# Patient Record
Sex: FEMALE | Race: BLACK OR AFRICAN AMERICAN | NOT HISPANIC OR LATINO | Employment: UNEMPLOYED | RURAL
[De-identification: names, ages, dates, MRNs, and addresses within clinical notes are randomized per-mention and may not be internally consistent; named-entity substitution may affect disease eponyms.]

---

## 2020-08-01 ENCOUNTER — HISTORICAL (OUTPATIENT)
Dept: ADMINISTRATIVE | Facility: HOSPITAL | Age: 35
End: 2020-08-01

## 2020-08-01 LAB — SARS-COV+SARS-COV-2 AG RESP QL IA.RAPID: NEGATIVE

## 2021-05-10 ENCOUNTER — PROCEDURE VISIT (OUTPATIENT)
Dept: OBSTETRICS AND GYNECOLOGY | Facility: CLINIC | Age: 36
End: 2021-05-10
Payer: COMMERCIAL

## 2021-05-10 ENCOUNTER — OFFICE VISIT (OUTPATIENT)
Dept: OBSTETRICS AND GYNECOLOGY | Facility: CLINIC | Age: 36
End: 2021-05-10
Payer: COMMERCIAL

## 2021-05-10 VITALS
OXYGEN SATURATION: 99 % | BODY MASS INDEX: 35.73 KG/M2 | RESPIRATION RATE: 18 BRPM | HEIGHT: 60 IN | SYSTOLIC BLOOD PRESSURE: 145 MMHG | WEIGHT: 182 LBS | HEART RATE: 86 BPM | DIASTOLIC BLOOD PRESSURE: 95 MMHG | TEMPERATURE: 98 F

## 2021-05-10 DIAGNOSIS — R10.2 PELVIC PAIN: ICD-10-CM

## 2021-05-10 DIAGNOSIS — Z11.4 ENCOUNTER FOR SCREENING FOR HIV: ICD-10-CM

## 2021-05-10 DIAGNOSIS — Z12.4 SCREENING FOR MALIGNANT NEOPLASM OF THE CERVIX: Primary | ICD-10-CM

## 2021-05-10 DIAGNOSIS — Z72.51 HIGH RISK SEXUAL BEHAVIOR, UNSPECIFIED TYPE: ICD-10-CM

## 2021-05-10 DIAGNOSIS — R10.2 PELVIC PAIN: Primary | ICD-10-CM

## 2021-05-10 DIAGNOSIS — Z11.3 SCREENING EXAMINATION FOR VENEREAL DISEASE: ICD-10-CM

## 2021-05-10 DIAGNOSIS — Z01.419 ROUTINE GYNECOLOGICAL EXAMINATION: ICD-10-CM

## 2021-05-10 LAB
HAV IGM SER QL: NORMAL
HBV CORE IGM SER QL: NORMAL
HBV SURFACE AG SERPL QL IA: NORMAL
HCV AB SER QL: NORMAL
HIV 1+O+2 AB SERPL QL: NORMAL
SYPHILIS AB INTERPRETATION: NORMAL

## 2021-05-10 PROCEDURE — 80074 ACUTE HEPATITIS PANEL: CPT | Mod: ,,, | Performed by: CLINICAL MEDICAL LABORATORY

## 2021-05-10 PROCEDURE — 36415 PR COLLECTION VENOUS BLOOD,VENIPUNCTURE: ICD-10-PCS | Mod: ,,, | Performed by: OBSTETRICS & GYNECOLOGY

## 2021-05-10 PROCEDURE — 87510 BACTERIAL VAGINOSIS: ICD-10-PCS | Mod: ,,, | Performed by: CLINICAL MEDICAL LABORATORY

## 2021-05-10 PROCEDURE — 86780 TREPONEMA PALLIDUM: CPT | Mod: ,,, | Performed by: CLINICAL MEDICAL LABORATORY

## 2021-05-10 PROCEDURE — 36415 COLL VENOUS BLD VENIPUNCTURE: CPT | Mod: ,,, | Performed by: OBSTETRICS & GYNECOLOGY

## 2021-05-10 PROCEDURE — 87591 N.GONORRHOEAE DNA AMP PROB: CPT | Mod: ,,, | Performed by: CLINICAL MEDICAL LABORATORY

## 2021-05-10 PROCEDURE — 3008F BODY MASS INDEX DOCD: CPT | Mod: ,,, | Performed by: OBSTETRICS & GYNECOLOGY

## 2021-05-10 PROCEDURE — 99499 NO LOS: ICD-10-PCS | Mod: ,,, | Performed by: OBSTETRICS & GYNECOLOGY

## 2021-05-10 PROCEDURE — 76856 US EXAM PELVIC COMPLETE: CPT | Mod: ,,, | Performed by: OBSTETRICS & GYNECOLOGY

## 2021-05-10 PROCEDURE — 88142 CYTOPATH C/V THIN LAYER: CPT | Mod: GCY | Performed by: OBSTETRICS & GYNECOLOGY

## 2021-05-10 PROCEDURE — 87510 GARDNER VAG DNA DIR PROBE: CPT | Mod: ,,, | Performed by: CLINICAL MEDICAL LABORATORY

## 2021-05-10 PROCEDURE — 87480 BACTERIAL VAGINOSIS: ICD-10-PCS | Mod: ,,, | Performed by: CLINICAL MEDICAL LABORATORY

## 2021-05-10 PROCEDURE — 99385 PREV VISIT NEW AGE 18-39: CPT | Mod: 25,,, | Performed by: OBSTETRICS & GYNECOLOGY

## 2021-05-10 PROCEDURE — 76856 PR  ECHO,PELVIC (NONOBSTETRIC): ICD-10-PCS | Mod: ,,, | Performed by: OBSTETRICS & GYNECOLOGY

## 2021-05-10 PROCEDURE — 87480 CANDIDA DNA DIR PROBE: CPT | Mod: ,,, | Performed by: CLINICAL MEDICAL LABORATORY

## 2021-05-10 PROCEDURE — 1126F AMNT PAIN NOTED NONE PRSNT: CPT | Mod: ,,, | Performed by: OBSTETRICS & GYNECOLOGY

## 2021-05-10 PROCEDURE — 86780 TREPONEMA PALLIDUM (SYPHILIS) ANTIBODY: ICD-10-PCS | Mod: ,,, | Performed by: CLINICAL MEDICAL LABORATORY

## 2021-05-10 PROCEDURE — 80074 HEPATITIS PANEL, ACUTE: ICD-10-PCS | Mod: ,,, | Performed by: CLINICAL MEDICAL LABORATORY

## 2021-05-10 PROCEDURE — 86703 HIV-1/HIV-2 1 RESULT ANTBDY: CPT | Mod: ,,, | Performed by: CLINICAL MEDICAL LABORATORY

## 2021-05-10 PROCEDURE — 1126F PR PAIN SEVERITY QUANTIFIED, NO PAIN PRESENT: ICD-10-PCS | Mod: ,,, | Performed by: OBSTETRICS & GYNECOLOGY

## 2021-05-10 PROCEDURE — 87491 CHLMYD TRACH DNA AMP PROBE: CPT | Mod: ,,, | Performed by: CLINICAL MEDICAL LABORATORY

## 2021-05-10 PROCEDURE — 87591 CHLAMYDIA/GONORRHOEAE(GC), PCR: ICD-10-PCS | Mod: ,,, | Performed by: CLINICAL MEDICAL LABORATORY

## 2021-05-10 PROCEDURE — 87491 CHLAMYDIA/GONORRHOEAE(GC), PCR: ICD-10-PCS | Mod: ,,, | Performed by: CLINICAL MEDICAL LABORATORY

## 2021-05-10 PROCEDURE — 3008F PR BODY MASS INDEX (BMI) DOCUMENTED: ICD-10-PCS | Mod: ,,, | Performed by: OBSTETRICS & GYNECOLOGY

## 2021-05-10 PROCEDURE — 99385 PR PREVENTIVE VISIT,NEW,18-39: ICD-10-PCS | Mod: 25,,, | Performed by: OBSTETRICS & GYNECOLOGY

## 2021-05-10 PROCEDURE — 87660 BACTERIAL VAGINOSIS: ICD-10-PCS | Mod: ,,, | Performed by: CLINICAL MEDICAL LABORATORY

## 2021-05-10 PROCEDURE — 87660 TRICHOMONAS VAGIN DIR PROBE: CPT | Mod: ,,, | Performed by: CLINICAL MEDICAL LABORATORY

## 2021-05-10 PROCEDURE — 86703 HIV 1 / 2 ANTIBODY: ICD-10-PCS | Mod: ,,, | Performed by: CLINICAL MEDICAL LABORATORY

## 2021-05-10 PROCEDURE — 99499 UNLISTED E&M SERVICE: CPT | Mod: ,,, | Performed by: OBSTETRICS & GYNECOLOGY

## 2021-05-10 RX ORDER — ETONOGESTREL 68 MG/1
IMPLANT SUBCUTANEOUS
Status: ON HOLD | COMMUNITY
End: 2021-08-25 | Stop reason: ALTCHOICE

## 2021-05-10 RX ORDER — ERGOCALCIFEROL 1.25 MG/1
50000 CAPSULE ORAL
COMMUNITY
Start: 2021-04-17 | End: 2022-12-07 | Stop reason: SDUPTHER

## 2021-05-10 RX ORDER — POTASSIUM CHLORIDE 20 MEQ/1
20 TABLET, EXTENDED RELEASE ORAL DAILY
COMMUNITY
Start: 2021-03-04 | End: 2024-01-19

## 2021-05-10 RX ORDER — CETIRIZINE HYDROCHLORIDE 10 MG/1
TABLET ORAL
COMMUNITY
Start: 2021-03-15 | End: 2024-01-19

## 2021-05-10 RX ORDER — ALBUTEROL SULFATE 90 UG/1
AEROSOL, METERED RESPIRATORY (INHALATION)
COMMUNITY
End: 2022-12-07 | Stop reason: SDUPTHER

## 2021-05-10 RX ORDER — ATENOLOL AND CHLORTHALIDONE TABLET 50; 25 MG/1; MG/1
0.5 TABLET ORAL DAILY
COMMUNITY
Start: 2021-03-04 | End: 2023-03-15 | Stop reason: SDUPTHER

## 2021-05-10 RX ORDER — FLUOXETINE HYDROCHLORIDE 20 MG/1
40 CAPSULE ORAL DAILY
COMMUNITY
Start: 2021-04-05 | End: 2023-03-15 | Stop reason: SDUPTHER

## 2021-05-10 RX ORDER — TEMAZEPAM 30 MG/1
CAPSULE ORAL
COMMUNITY
Start: 2021-04-05 | End: 2022-12-07

## 2021-05-11 LAB
CANDIDA SPECIES: NEGATIVE
GARDNERELLA: NEGATIVE
GH SERPL-MCNC: NORMAL NG/ML
INSULIN SERPL-ACNC: NORMAL U[IU]/ML
LAB AP CLINICAL INFORMATION: NORMAL
LAB AP GYN INTERPRETATION: NEGATIVE
LAB AP PAP DISCLAIMER COMMENTS: NORMAL
RENIN PLAS-CCNC: NORMAL NG/ML/H
TRICHOMONAS: NEGATIVE

## 2021-05-13 LAB
CHLAMYDIA BY PCR: NEGATIVE
N. GONORRHOEAE (GC) BY PCR: NEGATIVE

## 2021-06-07 ENCOUNTER — OFFICE VISIT (OUTPATIENT)
Dept: OBSTETRICS AND GYNECOLOGY | Facility: CLINIC | Age: 36
End: 2021-06-07
Payer: COMMERCIAL

## 2021-06-07 VITALS
TEMPERATURE: 98 F | HEART RATE: 77 BPM | HEIGHT: 61 IN | WEIGHT: 183 LBS | DIASTOLIC BLOOD PRESSURE: 92 MMHG | RESPIRATION RATE: 18 BRPM | BODY MASS INDEX: 34.55 KG/M2 | OXYGEN SATURATION: 98 % | SYSTOLIC BLOOD PRESSURE: 134 MMHG

## 2021-06-07 DIAGNOSIS — R63.1 POLYDIPSIA: Primary | ICD-10-CM

## 2021-06-07 DIAGNOSIS — R10.2 PELVIC PAIN: ICD-10-CM

## 2021-06-07 DIAGNOSIS — N92.0 MENORRHAGIA WITH REGULAR CYCLE: ICD-10-CM

## 2021-06-07 LAB
EST. AVERAGE GLUCOSE BLD GHB EST-MCNC: 94 MG/DL
HBA1C MFR BLD HPLC: 5.4 % (ref 4.5–6.6)

## 2021-06-07 PROCEDURE — 36415 PR COLLECTION VENOUS BLOOD,VENIPUNCTURE: ICD-10-PCS | Mod: ,,, | Performed by: OBSTETRICS & GYNECOLOGY

## 2021-06-07 PROCEDURE — 99214 PR OFFICE/OUTPT VISIT, EST, LEVL IV, 30-39 MIN: ICD-10-PCS | Mod: ,,, | Performed by: OBSTETRICS & GYNECOLOGY

## 2021-06-07 PROCEDURE — 1126F PR PAIN SEVERITY QUANTIFIED, NO PAIN PRESENT: ICD-10-PCS | Mod: ,,, | Performed by: OBSTETRICS & GYNECOLOGY

## 2021-06-07 PROCEDURE — 3008F PR BODY MASS INDEX (BMI) DOCUMENTED: ICD-10-PCS | Mod: ,,, | Performed by: OBSTETRICS & GYNECOLOGY

## 2021-06-07 PROCEDURE — 3008F BODY MASS INDEX DOCD: CPT | Mod: ,,, | Performed by: OBSTETRICS & GYNECOLOGY

## 2021-06-07 PROCEDURE — 83036 HEMOGLOBIN A1C: ICD-10-PCS | Mod: ,,, | Performed by: CLINICAL MEDICAL LABORATORY

## 2021-06-07 PROCEDURE — 1126F AMNT PAIN NOTED NONE PRSNT: CPT | Mod: ,,, | Performed by: OBSTETRICS & GYNECOLOGY

## 2021-06-07 PROCEDURE — 99214 OFFICE O/P EST MOD 30 MIN: CPT | Mod: ,,, | Performed by: OBSTETRICS & GYNECOLOGY

## 2021-06-07 PROCEDURE — 36415 COLL VENOUS BLD VENIPUNCTURE: CPT | Mod: ,,, | Performed by: OBSTETRICS & GYNECOLOGY

## 2021-06-07 PROCEDURE — 83036 HEMOGLOBIN GLYCOSYLATED A1C: CPT | Mod: ,,, | Performed by: CLINICAL MEDICAL LABORATORY

## 2021-06-07 RX ORDER — SODIUM CHLORIDE 9 MG/ML
INJECTION, SOLUTION INTRAVENOUS CONTINUOUS
Status: CANCELLED | OUTPATIENT
Start: 2021-06-07

## 2021-06-07 RX ORDER — LANOLIN ALCOHOL/MO/W.PET/CERES
100 CREAM (GRAM) TOPICAL DAILY
COMMUNITY
End: 2022-12-07 | Stop reason: DRUGHIGH

## 2021-07-12 ENCOUNTER — PROCEDURE VISIT (OUTPATIENT)
Dept: OBSTETRICS AND GYNECOLOGY | Facility: CLINIC | Age: 36
End: 2021-07-12
Payer: COMMERCIAL

## 2021-07-12 VITALS
TEMPERATURE: 99 F | OXYGEN SATURATION: 98 % | WEIGHT: 186 LBS | DIASTOLIC BLOOD PRESSURE: 95 MMHG | SYSTOLIC BLOOD PRESSURE: 135 MMHG | RESPIRATION RATE: 18 BRPM | HEIGHT: 61 IN | HEART RATE: 84 BPM | BODY MASS INDEX: 35.12 KG/M2

## 2021-07-12 DIAGNOSIS — Z97.5 BREAKTHROUGH BLEEDING ON NEXPLANON: ICD-10-CM

## 2021-07-12 DIAGNOSIS — N92.0 MENORRHAGIA WITH REGULAR CYCLE: Primary | ICD-10-CM

## 2021-07-12 DIAGNOSIS — N92.1 BREAKTHROUGH BLEEDING ON NEXPLANON: ICD-10-CM

## 2021-07-12 PROCEDURE — 99024 PR POST-OP FOLLOW-UP VISIT: ICD-10-PCS | Mod: ,,, | Performed by: OBSTETRICS & GYNECOLOGY

## 2021-07-12 PROCEDURE — 11982 PR REMOVAL DRUG IMPLANT DEVICE: ICD-10-PCS | Mod: ,,, | Performed by: OBSTETRICS & GYNECOLOGY

## 2021-07-12 PROCEDURE — 99024 POSTOP FOLLOW-UP VISIT: CPT | Mod: ,,, | Performed by: OBSTETRICS & GYNECOLOGY

## 2021-07-12 PROCEDURE — 11982 REMOVE DRUG IMPLANT DEVICE: CPT | Mod: ,,, | Performed by: OBSTETRICS & GYNECOLOGY

## 2021-08-09 ENCOUNTER — OFFICE VISIT (OUTPATIENT)
Dept: OBSTETRICS AND GYNECOLOGY | Facility: CLINIC | Age: 36
End: 2021-08-09
Payer: COMMERCIAL

## 2021-08-09 VITALS
HEIGHT: 61 IN | RESPIRATION RATE: 18 BRPM | BODY MASS INDEX: 34.36 KG/M2 | DIASTOLIC BLOOD PRESSURE: 71 MMHG | WEIGHT: 182 LBS | HEART RATE: 80 BPM | OXYGEN SATURATION: 98 % | TEMPERATURE: 98 F | SYSTOLIC BLOOD PRESSURE: 103 MMHG

## 2021-08-09 DIAGNOSIS — N92.0 MENORRHAGIA WITH REGULAR CYCLE: Primary | ICD-10-CM

## 2021-08-09 DIAGNOSIS — R10.2 PELVIC PAIN: ICD-10-CM

## 2021-08-09 PROCEDURE — 3008F PR BODY MASS INDEX (BMI) DOCUMENTED: ICD-10-PCS | Mod: ,,, | Performed by: OBSTETRICS & GYNECOLOGY

## 2021-08-09 PROCEDURE — 3044F HG A1C LEVEL LT 7.0%: CPT | Mod: ,,, | Performed by: OBSTETRICS & GYNECOLOGY

## 2021-08-09 PROCEDURE — 1125F PR PAIN SEVERITY QUANTIFIED, PAIN PRESENT: ICD-10-PCS | Mod: ,,, | Performed by: OBSTETRICS & GYNECOLOGY

## 2021-08-09 PROCEDURE — 1159F PR MEDICATION LIST DOCUMENTED IN MEDICAL RECORD: ICD-10-PCS | Mod: ,,, | Performed by: OBSTETRICS & GYNECOLOGY

## 2021-08-09 PROCEDURE — 3074F PR MOST RECENT SYSTOLIC BLOOD PRESSURE < 130 MM HG: ICD-10-PCS | Mod: ,,, | Performed by: OBSTETRICS & GYNECOLOGY

## 2021-08-09 PROCEDURE — 1125F AMNT PAIN NOTED PAIN PRSNT: CPT | Mod: ,,, | Performed by: OBSTETRICS & GYNECOLOGY

## 2021-08-09 PROCEDURE — 1160F RVW MEDS BY RX/DR IN RCRD: CPT | Mod: ,,, | Performed by: OBSTETRICS & GYNECOLOGY

## 2021-08-09 PROCEDURE — 3078F PR MOST RECENT DIASTOLIC BLOOD PRESSURE < 80 MM HG: ICD-10-PCS | Mod: ,,, | Performed by: OBSTETRICS & GYNECOLOGY

## 2021-08-09 PROCEDURE — 3008F BODY MASS INDEX DOCD: CPT | Mod: ,,, | Performed by: OBSTETRICS & GYNECOLOGY

## 2021-08-09 PROCEDURE — 99214 OFFICE O/P EST MOD 30 MIN: CPT | Mod: ,,, | Performed by: OBSTETRICS & GYNECOLOGY

## 2021-08-09 PROCEDURE — 99214 PR OFFICE/OUTPT VISIT, EST, LEVL IV, 30-39 MIN: ICD-10-PCS | Mod: ,,, | Performed by: OBSTETRICS & GYNECOLOGY

## 2021-08-09 PROCEDURE — 3078F DIAST BP <80 MM HG: CPT | Mod: ,,, | Performed by: OBSTETRICS & GYNECOLOGY

## 2021-08-09 PROCEDURE — 3044F PR MOST RECENT HEMOGLOBIN A1C LEVEL <7.0%: ICD-10-PCS | Mod: ,,, | Performed by: OBSTETRICS & GYNECOLOGY

## 2021-08-09 PROCEDURE — 1160F PR REVIEW ALL MEDS BY PRESCRIBER/CLIN PHARMACIST DOCUMENTED: ICD-10-PCS | Mod: ,,, | Performed by: OBSTETRICS & GYNECOLOGY

## 2021-08-09 PROCEDURE — 1159F MED LIST DOCD IN RCRD: CPT | Mod: ,,, | Performed by: OBSTETRICS & GYNECOLOGY

## 2021-08-09 PROCEDURE — 3074F SYST BP LT 130 MM HG: CPT | Mod: ,,, | Performed by: OBSTETRICS & GYNECOLOGY

## 2021-08-25 ENCOUNTER — ANESTHESIA (OUTPATIENT)
Dept: SURGERY | Facility: HOSPITAL | Age: 36
End: 2021-08-25
Payer: COMMERCIAL

## 2021-08-25 ENCOUNTER — HOSPITAL ENCOUNTER (OUTPATIENT)
Facility: HOSPITAL | Age: 36
Discharge: HOME | End: 2021-08-25
Attending: OBSTETRICS & GYNECOLOGY | Admitting: OBSTETRICS & GYNECOLOGY
Payer: COMMERCIAL

## 2021-08-25 ENCOUNTER — ANESTHESIA EVENT (OUTPATIENT)
Dept: SURGERY | Facility: HOSPITAL | Age: 36
End: 2021-08-25
Payer: COMMERCIAL

## 2021-08-25 VITALS
SYSTOLIC BLOOD PRESSURE: 112 MMHG | HEART RATE: 96 BPM | BODY MASS INDEX: 36.32 KG/M2 | OXYGEN SATURATION: 92 % | TEMPERATURE: 98 F | WEIGHT: 185 LBS | DIASTOLIC BLOOD PRESSURE: 68 MMHG | HEIGHT: 60 IN | RESPIRATION RATE: 18 BRPM

## 2021-08-25 DIAGNOSIS — R63.1 POLYDIPSIA: ICD-10-CM

## 2021-08-25 DIAGNOSIS — N92.0 MENORRHAGIA WITH REGULAR CYCLE: ICD-10-CM

## 2021-08-25 DIAGNOSIS — Z90.710 S/P LAPAROSCOPIC HYSTERECTOMY: Primary | ICD-10-CM

## 2021-08-25 DIAGNOSIS — I10 HTN (HYPERTENSION): ICD-10-CM

## 2021-08-25 PROBLEM — R10.2 PELVIC PAIN: Status: ACTIVE | Noted: 2021-08-25

## 2021-08-25 LAB
BASOPHILS # BLD AUTO: 0.03 K/UL (ref 0–0.2)
BASOPHILS NFR BLD AUTO: 0.2 % (ref 0–1)
DIFFERENTIAL METHOD BLD: ABNORMAL
EOSINOPHIL # BLD AUTO: 0 K/UL (ref 0–0.5)
EOSINOPHIL NFR BLD AUTO: 0 % (ref 1–4)
ERYTHROCYTE [DISTWIDTH] IN BLOOD BY AUTOMATED COUNT: 14.5 % (ref 11.5–14.5)
HCT VFR BLD AUTO: 35 % (ref 38–47)
HGB BLD-MCNC: 11.9 G/DL (ref 12–16)
IMM GRANULOCYTES # BLD AUTO: 0.24 K/UL (ref 0–0.04)
IMM GRANULOCYTES NFR BLD: 1.4 % (ref 0–0.4)
LYMPHOCYTES # BLD AUTO: 1.38 K/UL (ref 1–4.8)
LYMPHOCYTES NFR BLD AUTO: 7.9 % (ref 27–41)
MCH RBC QN AUTO: 28.7 PG (ref 27–31)
MCHC RBC AUTO-ENTMCNC: 34 G/DL (ref 32–36)
MCV RBC AUTO: 84.5 FL (ref 80–96)
MONOCYTES # BLD AUTO: 0.36 K/UL (ref 0–0.8)
MONOCYTES NFR BLD AUTO: 2.1 % (ref 2–6)
MPC BLD CALC-MCNC: 8.9 FL (ref 9.4–12.4)
NEUTROPHILS # BLD AUTO: 15.42 K/UL (ref 1.8–7.7)
NEUTROPHILS NFR BLD AUTO: 88.4 % (ref 53–65)
NRBC # BLD AUTO: 0 X10E3/UL
NRBC, AUTO (.00): 0 %
PLATELET # BLD AUTO: 362 K/UL (ref 150–400)
RBC # BLD AUTO: 4.14 M/UL (ref 4.2–5.4)
WBC # BLD AUTO: 17.43 K/UL (ref 4.5–11)

## 2021-08-25 PROCEDURE — 71000033 HC RECOVERY, INTIAL HOUR: Performed by: OBSTETRICS & GYNECOLOGY

## 2021-08-25 PROCEDURE — 25000003 PHARM REV CODE 250: Performed by: NURSE ANESTHETIST, CERTIFIED REGISTERED

## 2021-08-25 PROCEDURE — 25000003 PHARM REV CODE 250: Performed by: OBSTETRICS & GYNECOLOGY

## 2021-08-25 PROCEDURE — 27000716 HC OXISENSOR PROBE, ANY SIZE: Performed by: ANESTHESIOLOGY

## 2021-08-25 PROCEDURE — 00840 ANES IPER PX LOWER ABD NOS: CPT | Performed by: OBSTETRICS & GYNECOLOGY

## 2021-08-25 PROCEDURE — 27000689 HC BLADE LARYNGOSCOPE ANY SIZE: Performed by: ANESTHESIOLOGY

## 2021-08-25 PROCEDURE — 88307 TISSUE EXAM BY PATHOLOGIST: CPT | Mod: SUR | Performed by: OBSTETRICS & GYNECOLOGY

## 2021-08-25 PROCEDURE — D9220A PRA ANESTHESIA: ICD-10-PCS | Mod: CRNA,,, | Performed by: NURSE ANESTHETIST, CERTIFIED REGISTERED

## 2021-08-25 PROCEDURE — 64488 PR TAP BLOCK BILAT; BY INJECTION(S) INCL IMAG GUIDANCE: ICD-10-PCS | Mod: XU,,, | Performed by: ANESTHESIOLOGY

## 2021-08-25 PROCEDURE — D9220A PRA ANESTHESIA: Mod: ANES,,, | Performed by: ANESTHESIOLOGY

## 2021-08-25 PROCEDURE — 58571 TLH W/T/O 250 G OR LESS: CPT | Mod: 52,,, | Performed by: SURGERY

## 2021-08-25 PROCEDURE — 27000509 HC TUBE SALEM SUMP ANY SIZE: Performed by: ANESTHESIOLOGY

## 2021-08-25 PROCEDURE — 63600175 PHARM REV CODE 636 W HCPCS: Performed by: ANESTHESIOLOGY

## 2021-08-25 PROCEDURE — 93010 ELECTROCARDIOGRAM REPORT: CPT | Mod: ,,, | Performed by: STUDENT IN AN ORGANIZED HEALTH CARE EDUCATION/TRAINING PROGRAM

## 2021-08-25 PROCEDURE — 36415 COLL VENOUS BLD VENIPUNCTURE: CPT | Performed by: OBSTETRICS & GYNECOLOGY

## 2021-08-25 PROCEDURE — 25000003 PHARM REV CODE 250: Performed by: ANESTHESIOLOGY

## 2021-08-25 PROCEDURE — D9220A PRA ANESTHESIA: ICD-10-PCS | Mod: ANES,,, | Performed by: ANESTHESIOLOGY

## 2021-08-25 PROCEDURE — 85025 COMPLETE CBC W/AUTO DIFF WBC: CPT | Performed by: OBSTETRICS & GYNECOLOGY

## 2021-08-25 PROCEDURE — 71000016 HC POSTOP RECOV ADDL HR: Performed by: OBSTETRICS & GYNECOLOGY

## 2021-08-25 PROCEDURE — 27201423 OPTIME MED/SURG SUP & DEVICES STERILE SUPPLY: Performed by: OBSTETRICS & GYNECOLOGY

## 2021-08-25 PROCEDURE — 63600175 PHARM REV CODE 636 W HCPCS: Performed by: NURSE ANESTHETIST, CERTIFIED REGISTERED

## 2021-08-25 PROCEDURE — 58571 PR LAPAROSCOPY W TOT HYSTERECTUTERUS <=250 GRAM  W TUBE/OVARY: ICD-10-PCS | Mod: 52,,, | Performed by: SURGERY

## 2021-08-25 PROCEDURE — 27000165 HC TUBE, ETT CUFFED: Performed by: ANESTHESIOLOGY

## 2021-08-25 PROCEDURE — 58571 TLH W/T/O 250 G OR LESS: CPT | Mod: ,,, | Performed by: OBSTETRICS & GYNECOLOGY

## 2021-08-25 PROCEDURE — 58571 PR LAPAROSCOPY W TOT HYSTERECTUTERUS <=250 GRAM  W TUBE/OVARY: ICD-10-PCS | Mod: ,,, | Performed by: OBSTETRICS & GYNECOLOGY

## 2021-08-25 PROCEDURE — C1729 CATH, DRAINAGE: HCPCS | Performed by: OBSTETRICS & GYNECOLOGY

## 2021-08-25 PROCEDURE — 36000711: Performed by: OBSTETRICS & GYNECOLOGY

## 2021-08-25 PROCEDURE — 94761 N-INVAS EAR/PLS OXIMETRY MLT: CPT

## 2021-08-25 PROCEDURE — 27000510 HC BLANKET BAIR HUGGER ANY SIZE: Performed by: ANESTHESIOLOGY

## 2021-08-25 PROCEDURE — 37000009 HC ANESTHESIA EA ADD 15 MINS: Performed by: OBSTETRICS & GYNECOLOGY

## 2021-08-25 PROCEDURE — 93005 ELECTROCARDIOGRAM TRACING: CPT | Mod: 59

## 2021-08-25 PROCEDURE — 27000260 *HC AIRWAY ORAL: Performed by: ANESTHESIOLOGY

## 2021-08-25 PROCEDURE — 37000008 HC ANESTHESIA 1ST 15 MINUTES: Performed by: OBSTETRICS & GYNECOLOGY

## 2021-08-25 PROCEDURE — 88307 SURGICAL PATHOLOGY: ICD-10-PCS | Mod: 26,,, | Performed by: PATHOLOGY

## 2021-08-25 PROCEDURE — D9220A PRA ANESTHESIA: Mod: CRNA,,, | Performed by: NURSE ANESTHETIST, CERTIFIED REGISTERED

## 2021-08-25 PROCEDURE — 64488 TAP BLOCK BI INJECTION: CPT | Mod: XU,,, | Performed by: ANESTHESIOLOGY

## 2021-08-25 PROCEDURE — 93010 EKG 12-LEAD: ICD-10-PCS | Mod: ,,, | Performed by: STUDENT IN AN ORGANIZED HEALTH CARE EDUCATION/TRAINING PROGRAM

## 2021-08-25 PROCEDURE — 36000710: Performed by: OBSTETRICS & GYNECOLOGY

## 2021-08-25 PROCEDURE — 88307 TISSUE EXAM BY PATHOLOGIST: CPT | Mod: 26,,, | Performed by: PATHOLOGY

## 2021-08-25 PROCEDURE — 58571 TLH W/T/O 250 G OR LESS: CPT | Mod: 52,PBBFAC | Performed by: SURGERY

## 2021-08-25 PROCEDURE — 71000015 HC POSTOP RECOV 1ST HR: Performed by: OBSTETRICS & GYNECOLOGY

## 2021-08-25 RX ORDER — OXYCODONE AND ACETAMINOPHEN 5; 325 MG/1; MG/1
1 TABLET ORAL EVERY 4 HOURS PRN
Status: DISCONTINUED | OUTPATIENT
Start: 2021-08-25 | End: 2021-08-25 | Stop reason: HOSPADM

## 2021-08-25 RX ORDER — ONDANSETRON 2 MG/ML
4 INJECTION INTRAMUSCULAR; INTRAVENOUS DAILY PRN
Status: DISCONTINUED | OUTPATIENT
Start: 2021-08-25 | End: 2021-08-25 | Stop reason: HOSPADM

## 2021-08-25 RX ORDER — ONDANSETRON 2 MG/ML
INJECTION INTRAMUSCULAR; INTRAVENOUS
Status: DISCONTINUED | OUTPATIENT
Start: 2021-08-25 | End: 2021-08-25

## 2021-08-25 RX ORDER — SODIUM CHLORIDE 9 MG/ML
INJECTION, SOLUTION INTRAVENOUS CONTINUOUS
Status: DISCONTINUED | OUTPATIENT
Start: 2021-08-25 | End: 2021-08-25 | Stop reason: HOSPADM

## 2021-08-25 RX ORDER — MORPHINE SULFATE 10 MG/ML
4 INJECTION INTRAMUSCULAR; INTRAVENOUS; SUBCUTANEOUS EVERY 5 MIN PRN
Status: DISCONTINUED | OUTPATIENT
Start: 2021-08-25 | End: 2021-08-25 | Stop reason: HOSPADM

## 2021-08-25 RX ORDER — ROPIVACAINE HYDROCHLORIDE 7.5 MG/ML
INJECTION, SOLUTION EPIDURAL; PERINEURAL
Status: COMPLETED | OUTPATIENT
Start: 2021-08-25 | End: 2021-08-25

## 2021-08-25 RX ORDER — SCOLOPAMINE TRANSDERMAL SYSTEM 1 MG/1
1 PATCH, EXTENDED RELEASE TRANSDERMAL ONCE
Status: DISCONTINUED | OUTPATIENT
Start: 2021-08-25 | End: 2021-08-25 | Stop reason: HOSPADM

## 2021-08-25 RX ORDER — FENTANYL CITRATE 50 UG/ML
INJECTION, SOLUTION INTRAMUSCULAR; INTRAVENOUS
Status: DISCONTINUED | OUTPATIENT
Start: 2021-08-25 | End: 2021-08-25

## 2021-08-25 RX ORDER — DIPHENHYDRAMINE HCL 25 MG
25 CAPSULE ORAL EVERY 4 HOURS PRN
Status: DISCONTINUED | OUTPATIENT
Start: 2021-08-25 | End: 2021-08-25 | Stop reason: HOSPADM

## 2021-08-25 RX ORDER — HYDROMORPHONE HYDROCHLORIDE 2 MG/ML
0.5 INJECTION, SOLUTION INTRAMUSCULAR; INTRAVENOUS; SUBCUTANEOUS EVERY 5 MIN PRN
Status: DISCONTINUED | OUTPATIENT
Start: 2021-08-25 | End: 2021-08-25 | Stop reason: HOSPADM

## 2021-08-25 RX ORDER — DIPHENHYDRAMINE HYDROCHLORIDE 50 MG/ML
25 INJECTION INTRAMUSCULAR; INTRAVENOUS EVERY 6 HOURS PRN
Status: DISCONTINUED | OUTPATIENT
Start: 2021-08-25 | End: 2021-08-25 | Stop reason: HOSPADM

## 2021-08-25 RX ORDER — LIDOCAINE HYDROCHLORIDE 10 MG/ML
1 INJECTION, SOLUTION EPIDURAL; INFILTRATION; INTRACAUDAL; PERINEURAL ONCE
Status: DISCONTINUED | OUTPATIENT
Start: 2021-08-25 | End: 2021-08-25 | Stop reason: HOSPADM

## 2021-08-25 RX ORDER — MORPHINE SULFATE 10 MG/ML
3 INJECTION INTRAMUSCULAR; INTRAVENOUS; SUBCUTANEOUS
Status: DISCONTINUED | OUTPATIENT
Start: 2021-08-25 | End: 2021-08-25 | Stop reason: HOSPADM

## 2021-08-25 RX ORDER — OXYCODONE AND ACETAMINOPHEN 5; 325 MG/1; MG/1
2 TABLET ORAL EVERY 4 HOURS PRN
Qty: 28 TABLET | Refills: 0 | Status: SHIPPED | OUTPATIENT
Start: 2021-08-25 | End: 2022-12-07

## 2021-08-25 RX ORDER — DEXAMETHASONE SODIUM PHOSPHATE 4 MG/ML
INJECTION, SOLUTION INTRA-ARTICULAR; INTRALESIONAL; INTRAMUSCULAR; INTRAVENOUS; SOFT TISSUE
Status: DISCONTINUED | OUTPATIENT
Start: 2021-08-25 | End: 2021-08-25

## 2021-08-25 RX ORDER — ROCURONIUM BROMIDE 50 MG/5 ML
SYRINGE (ML) INTRAVENOUS
Status: DISCONTINUED | OUTPATIENT
Start: 2021-08-25 | End: 2021-08-25

## 2021-08-25 RX ORDER — PHENYLEPHRINE HYDROCHLORIDE 10 MG/ML
INJECTION INTRAVENOUS
Status: DISCONTINUED | OUTPATIENT
Start: 2021-08-25 | End: 2021-08-25

## 2021-08-25 RX ORDER — CEFAZOLIN SODIUM 1 G/3ML
INJECTION, POWDER, FOR SOLUTION INTRAMUSCULAR; INTRAVENOUS
Status: DISCONTINUED | OUTPATIENT
Start: 2021-08-25 | End: 2021-08-25

## 2021-08-25 RX ORDER — MIDAZOLAM HYDROCHLORIDE 1 MG/ML
INJECTION INTRAMUSCULAR; INTRAVENOUS
Status: DISCONTINUED | OUTPATIENT
Start: 2021-08-25 | End: 2021-08-25

## 2021-08-25 RX ORDER — ONDANSETRON 4 MG/1
8 TABLET, ORALLY DISINTEGRATING ORAL EVERY 8 HOURS PRN
Status: DISCONTINUED | OUTPATIENT
Start: 2021-08-25 | End: 2021-08-25 | Stop reason: HOSPADM

## 2021-08-25 RX ORDER — SIMETHICONE 80 MG
80 TABLET,CHEWABLE ORAL EVERY 4 HOURS PRN
Status: DISCONTINUED | OUTPATIENT
Start: 2021-08-25 | End: 2021-08-25 | Stop reason: HOSPADM

## 2021-08-25 RX ORDER — PROCHLORPERAZINE EDISYLATE 5 MG/ML
5 INJECTION INTRAMUSCULAR; INTRAVENOUS EVERY 6 HOURS PRN
Status: DISCONTINUED | OUTPATIENT
Start: 2021-08-25 | End: 2021-08-25 | Stop reason: HOSPADM

## 2021-08-25 RX ORDER — MUPIROCIN 20 MG/G
1 OINTMENT TOPICAL 2 TIMES DAILY
Status: DISCONTINUED | OUTPATIENT
Start: 2021-08-25 | End: 2021-08-25 | Stop reason: HOSPADM

## 2021-08-25 RX ORDER — MEPERIDINE HYDROCHLORIDE 25 MG/ML
25 INJECTION INTRAMUSCULAR; INTRAVENOUS; SUBCUTANEOUS EVERY 10 MIN PRN
Status: DISCONTINUED | OUTPATIENT
Start: 2021-08-25 | End: 2021-08-25 | Stop reason: HOSPADM

## 2021-08-25 RX ORDER — ACETAMINOPHEN 500 MG
1000 TABLET ORAL ONCE
Status: COMPLETED | OUTPATIENT
Start: 2021-08-25 | End: 2021-08-25

## 2021-08-25 RX ORDER — SODIUM CHLORIDE 0.9 G/100ML
IRRIGANT IRRIGATION
Status: DISCONTINUED | OUTPATIENT
Start: 2021-08-25 | End: 2021-08-25 | Stop reason: HOSPADM

## 2021-08-25 RX ORDER — LIDOCAINE HYDROCHLORIDE 20 MG/ML
INJECTION, SOLUTION EPIDURAL; INFILTRATION; INTRACAUDAL; PERINEURAL
Status: DISCONTINUED | OUTPATIENT
Start: 2021-08-25 | End: 2021-08-25

## 2021-08-25 RX ORDER — PROPOFOL 10 MG/ML
VIAL (ML) INTRAVENOUS
Status: DISCONTINUED | OUTPATIENT
Start: 2021-08-25 | End: 2021-08-25

## 2021-08-25 RX ORDER — SODIUM CHLORIDE, SODIUM LACTATE, POTASSIUM CHLORIDE, CALCIUM CHLORIDE 600; 310; 30; 20 MG/100ML; MG/100ML; MG/100ML; MG/100ML
INJECTION, SOLUTION INTRAVENOUS CONTINUOUS
Status: DISCONTINUED | OUTPATIENT
Start: 2021-08-25 | End: 2021-08-25 | Stop reason: HOSPADM

## 2021-08-25 RX ORDER — CEFAZOLIN SODIUM 2 G/50ML
2 SOLUTION INTRAVENOUS
Status: DISCONTINUED | OUTPATIENT
Start: 2021-08-25 | End: 2021-08-25 | Stop reason: HOSPADM

## 2021-08-25 RX ADMIN — Medication 25 MG: at 08:08

## 2021-08-25 RX ADMIN — SODIUM CHLORIDE, POTASSIUM CHLORIDE, SODIUM LACTATE AND CALCIUM CHLORIDE: 600; 310; 30; 20 INJECTION, SOLUTION INTRAVENOUS at 08:08

## 2021-08-25 RX ADMIN — SUGAMMADEX 200 MG: 100 INJECTION, SOLUTION INTRAVENOUS at 10:08

## 2021-08-25 RX ADMIN — Medication 15 MG: at 09:08

## 2021-08-25 RX ADMIN — PHENYLEPHRINE HYDROCHLORIDE 100 MCG: 10 INJECTION INTRAVENOUS at 09:08

## 2021-08-25 RX ADMIN — PHENYLEPHRINE HYDROCHLORIDE 100 MCG: 10 INJECTION INTRAVENOUS at 08:08

## 2021-08-25 RX ADMIN — ONDANSETRON 4 MG: 2 INJECTION INTRAMUSCULAR; INTRAVENOUS at 07:08

## 2021-08-25 RX ADMIN — FENTANYL CITRATE 100 MCG: 50 INJECTION INTRAMUSCULAR; INTRAVENOUS at 10:08

## 2021-08-25 RX ADMIN — HYDROMORPHONE HYDROCHLORIDE 0.5 MG: 2 INJECTION, SOLUTION INTRAMUSCULAR; INTRAVENOUS; SUBCUTANEOUS at 11:08

## 2021-08-25 RX ADMIN — MORPHINE SULFATE 4 MG: 10 INJECTION INTRAVENOUS at 11:08

## 2021-08-25 RX ADMIN — CEFAZOLIN 2 G: 1 INJECTION, POWDER, FOR SOLUTION INTRAMUSCULAR; INTRAVENOUS; PARENTERAL at 07:08

## 2021-08-25 RX ADMIN — Medication 10 MG: at 08:08

## 2021-08-25 RX ADMIN — PHENYLEPHRINE HYDROCHLORIDE 100 MCG: 10 INJECTION INTRAVENOUS at 10:08

## 2021-08-25 RX ADMIN — SCOPALAMINE 1 PATCH: 1 PATCH, EXTENDED RELEASE TRANSDERMAL at 06:08

## 2021-08-25 RX ADMIN — LIDOCAINE HYDROCHLORIDE 40 MG: 20 INJECTION, SOLUTION EPIDURAL; INFILTRATION; INTRACAUDAL; PERINEURAL at 07:08

## 2021-08-25 RX ADMIN — FENTANYL CITRATE 100 MCG: 50 INJECTION INTRAMUSCULAR; INTRAVENOUS at 07:08

## 2021-08-25 RX ADMIN — DEXAMETHASONE SODIUM PHOSPHATE 8 MG: 4 INJECTION, SOLUTION INTRA-ARTICULAR; INTRALESIONAL; INTRAMUSCULAR; INTRAVENOUS; SOFT TISSUE at 07:08

## 2021-08-25 RX ADMIN — ROPIVACAINE HYDROCHLORIDE 40 ML: 7.5 INJECTION, SOLUTION EPIDURAL; PERINEURAL at 10:08

## 2021-08-25 RX ADMIN — Medication 40 MG: at 07:08

## 2021-08-25 RX ADMIN — MIDAZOLAM HYDROCHLORIDE 2 MG: 1 INJECTION, SOLUTION INTRAMUSCULAR; INTRAVENOUS at 07:08

## 2021-08-25 RX ADMIN — ACETAMINOPHEN 1000 MG: 500 TABLET ORAL at 06:08

## 2021-08-25 RX ADMIN — MORPHINE SULFATE 2 MG: 10 INJECTION INTRAVENOUS at 11:08

## 2021-08-25 RX ADMIN — PROPOFOL 180 MG: 10 INJECTION, EMULSION INTRAVENOUS at 07:08

## 2021-08-25 RX ADMIN — SODIUM CHLORIDE, POTASSIUM CHLORIDE, SODIUM LACTATE AND CALCIUM CHLORIDE: 600; 310; 30; 20 INJECTION, SOLUTION INTRAVENOUS at 07:08

## 2021-08-25 RX ADMIN — MORPHINE SULFATE 4 MG: 10 INJECTION INTRAVENOUS at 10:08

## 2021-08-26 LAB
ESTROGEN SERPL-MCNC: NORMAL PG/ML
LAB AP GROSS DESCRIPTION: NORMAL
LAB AP LABORATORY NOTES: NORMAL
T3RU NFR SERPL: NORMAL %

## 2021-09-02 ENCOUNTER — TELEPHONE (OUTPATIENT)
Dept: OBSTETRICS AND GYNECOLOGY | Facility: CLINIC | Age: 36
End: 2021-09-02

## 2021-09-09 ENCOUNTER — OFFICE VISIT (OUTPATIENT)
Dept: OBSTETRICS AND GYNECOLOGY | Facility: CLINIC | Age: 36
End: 2021-09-09
Payer: COMMERCIAL

## 2021-09-09 VITALS
DIASTOLIC BLOOD PRESSURE: 95 MMHG | RESPIRATION RATE: 18 BRPM | HEART RATE: 67 BPM | OXYGEN SATURATION: 98 % | WEIGHT: 189 LBS | SYSTOLIC BLOOD PRESSURE: 137 MMHG | HEIGHT: 60 IN | BODY MASS INDEX: 37.11 KG/M2 | TEMPERATURE: 98 F

## 2021-09-09 DIAGNOSIS — Z11.3 SCREEN FOR SEXUALLY TRANSMITTED DISEASES: Primary | ICD-10-CM

## 2021-09-09 DIAGNOSIS — N76.0 ACUTE VAGINITIS: ICD-10-CM

## 2021-09-09 DIAGNOSIS — Z72.51 RISK FOR SEXUALLY TRANSMITTED DISEASE: ICD-10-CM

## 2021-09-09 DIAGNOSIS — Z11.4 SCREENING FOR HIV (HUMAN IMMUNODEFICIENCY VIRUS): ICD-10-CM

## 2021-09-09 LAB
CANDIDA SPECIES: NEGATIVE
GARDNERELLA: POSITIVE
HAV IGM SER QL: NORMAL
HBV CORE IGM SER QL: NORMAL
HBV SURFACE AG SERPL QL IA: NORMAL
HCV AB SER QL: NORMAL
HIV 1+O+2 AB SERPL QL: NORMAL
SYPHILIS AB INTERPRETATION: NORMAL
TRICHOMONAS: NEGATIVE

## 2021-09-09 PROCEDURE — 80074 ACUTE HEPATITIS PANEL: CPT | Mod: ,,, | Performed by: CLINICAL MEDICAL LABORATORY

## 2021-09-09 PROCEDURE — 1159F MED LIST DOCD IN RCRD: CPT | Mod: ,,, | Performed by: ADVANCED PRACTICE MIDWIFE

## 2021-09-09 PROCEDURE — 87389 HIV 1 / 2 ANTIBODY: ICD-10-PCS | Mod: ,,, | Performed by: CLINICAL MEDICAL LABORATORY

## 2021-09-09 PROCEDURE — 86696 HERPES SIMPLEX TYPE 2 TEST: CPT | Mod: ,,, | Performed by: CLINICAL MEDICAL LABORATORY

## 2021-09-09 PROCEDURE — 87480 CANDIDA DNA DIR PROBE: CPT | Mod: ,,, | Performed by: CLINICAL MEDICAL LABORATORY

## 2021-09-09 PROCEDURE — 3075F PR MOST RECENT SYSTOLIC BLOOD PRESS GE 130-139MM HG: ICD-10-PCS | Mod: ,,, | Performed by: ADVANCED PRACTICE MIDWIFE

## 2021-09-09 PROCEDURE — 36415 PR COLLECTION VENOUS BLOOD,VENIPUNCTURE: ICD-10-PCS | Mod: ,,, | Performed by: ADVANCED PRACTICE MIDWIFE

## 2021-09-09 PROCEDURE — 87660 BACTERIAL VAGINOSIS: ICD-10-PCS | Mod: ,,, | Performed by: CLINICAL MEDICAL LABORATORY

## 2021-09-09 PROCEDURE — 1159F PR MEDICATION LIST DOCUMENTED IN MEDICAL RECORD: ICD-10-PCS | Mod: ,,, | Performed by: ADVANCED PRACTICE MIDWIFE

## 2021-09-09 PROCEDURE — 3044F PR MOST RECENT HEMOGLOBIN A1C LEVEL <7.0%: ICD-10-PCS | Mod: ,,, | Performed by: ADVANCED PRACTICE MIDWIFE

## 2021-09-09 PROCEDURE — 87510 GARDNER VAG DNA DIR PROBE: CPT | Mod: ,,, | Performed by: CLINICAL MEDICAL LABORATORY

## 2021-09-09 PROCEDURE — 86696 HERPES SIMPLEX 1 & 2 IGG: ICD-10-PCS | Mod: ,,, | Performed by: CLINICAL MEDICAL LABORATORY

## 2021-09-09 PROCEDURE — 87491 CHLMYD TRACH DNA AMP PROBE: CPT | Mod: ,,, | Performed by: CLINICAL MEDICAL LABORATORY

## 2021-09-09 PROCEDURE — 87389 HIV-1 AG W/HIV-1&-2 AB AG IA: CPT | Mod: ,,, | Performed by: CLINICAL MEDICAL LABORATORY

## 2021-09-09 PROCEDURE — 87660 TRICHOMONAS VAGIN DIR PROBE: CPT | Mod: ,,, | Performed by: CLINICAL MEDICAL LABORATORY

## 2021-09-09 PROCEDURE — 3075F SYST BP GE 130 - 139MM HG: CPT | Mod: ,,, | Performed by: ADVANCED PRACTICE MIDWIFE

## 2021-09-09 PROCEDURE — 87510 BACTERIAL VAGINOSIS: ICD-10-PCS | Mod: ,,, | Performed by: CLINICAL MEDICAL LABORATORY

## 2021-09-09 PROCEDURE — 86780 TREPONEMA PALLIDUM (SYPHILIS) ANTIBODY: ICD-10-PCS | Mod: ,,, | Performed by: CLINICAL MEDICAL LABORATORY

## 2021-09-09 PROCEDURE — 87491 CHLAMYDIA/GONORRHOEAE(GC), PCR: ICD-10-PCS | Mod: ,,, | Performed by: CLINICAL MEDICAL LABORATORY

## 2021-09-09 PROCEDURE — 87591 CHLAMYDIA/GONORRHOEAE(GC), PCR: ICD-10-PCS | Mod: ,,, | Performed by: CLINICAL MEDICAL LABORATORY

## 2021-09-09 PROCEDURE — 3080F DIAST BP >= 90 MM HG: CPT | Mod: ,,, | Performed by: ADVANCED PRACTICE MIDWIFE

## 2021-09-09 PROCEDURE — 80074 HEPATITIS PANEL, ACUTE: ICD-10-PCS | Mod: ,,, | Performed by: CLINICAL MEDICAL LABORATORY

## 2021-09-09 PROCEDURE — 3008F BODY MASS INDEX DOCD: CPT | Mod: ,,, | Performed by: ADVANCED PRACTICE MIDWIFE

## 2021-09-09 PROCEDURE — 86695 HERPES SIMPLEX 1 & 2 IGG: ICD-10-PCS | Mod: ,,, | Performed by: CLINICAL MEDICAL LABORATORY

## 2021-09-09 PROCEDURE — 86695 HERPES SIMPLEX TYPE 1 TEST: CPT | Mod: ,,, | Performed by: CLINICAL MEDICAL LABORATORY

## 2021-09-09 PROCEDURE — 3080F PR MOST RECENT DIASTOLIC BLOOD PRESSURE >= 90 MM HG: ICD-10-PCS | Mod: ,,, | Performed by: ADVANCED PRACTICE MIDWIFE

## 2021-09-09 PROCEDURE — 99213 OFFICE O/P EST LOW 20 MIN: CPT | Mod: 24,,, | Performed by: ADVANCED PRACTICE MIDWIFE

## 2021-09-09 PROCEDURE — 86780 TREPONEMA PALLIDUM: CPT | Mod: ,,, | Performed by: CLINICAL MEDICAL LABORATORY

## 2021-09-09 PROCEDURE — 99213 PR OFFICE/OUTPT VISIT, EST, LEVL III, 20-29 MIN: ICD-10-PCS | Mod: 24,,, | Performed by: ADVANCED PRACTICE MIDWIFE

## 2021-09-09 PROCEDURE — 36415 COLL VENOUS BLD VENIPUNCTURE: CPT | Mod: ,,, | Performed by: ADVANCED PRACTICE MIDWIFE

## 2021-09-09 PROCEDURE — 87480 BACTERIAL VAGINOSIS: ICD-10-PCS | Mod: ,,, | Performed by: CLINICAL MEDICAL LABORATORY

## 2021-09-09 PROCEDURE — 3044F HG A1C LEVEL LT 7.0%: CPT | Mod: ,,, | Performed by: ADVANCED PRACTICE MIDWIFE

## 2021-09-09 PROCEDURE — 87591 N.GONORRHOEAE DNA AMP PROB: CPT | Mod: ,,, | Performed by: CLINICAL MEDICAL LABORATORY

## 2021-09-09 PROCEDURE — 3008F PR BODY MASS INDEX (BMI) DOCUMENTED: ICD-10-PCS | Mod: ,,, | Performed by: ADVANCED PRACTICE MIDWIFE

## 2021-09-11 LAB
CHLAMYDIA BY PCR: NEGATIVE
N. GONORRHOEAE (GC) BY PCR: NEGATIVE

## 2021-09-15 LAB
HSV TYPE 1 AB IGG INDEX: 0.16
HSV TYPE 2 AB IGG INDEX: 13
HSV1 IGG SER QL: NEGATIVE
HSV2 IGG SER QL: POSITIVE

## 2021-09-19 DIAGNOSIS — N76.0 BV (BACTERIAL VAGINOSIS): Primary | ICD-10-CM

## 2021-09-19 DIAGNOSIS — B96.89 BV (BACTERIAL VAGINOSIS): Primary | ICD-10-CM

## 2021-09-19 RX ORDER — METRONIDAZOLE 500 MG/1
500 TABLET ORAL 2 TIMES DAILY
Qty: 14 TABLET | Refills: 0 | Status: SHIPPED | OUTPATIENT
Start: 2021-09-19 | End: 2021-09-26

## 2022-12-07 ENCOUNTER — OFFICE VISIT (OUTPATIENT)
Dept: FAMILY MEDICINE | Facility: CLINIC | Age: 37
End: 2022-12-07
Payer: COMMERCIAL

## 2022-12-07 VITALS
BODY MASS INDEX: 34.86 KG/M2 | HEART RATE: 99 BPM | OXYGEN SATURATION: 97 % | RESPIRATION RATE: 17 BRPM | SYSTOLIC BLOOD PRESSURE: 122 MMHG | DIASTOLIC BLOOD PRESSURE: 78 MMHG | TEMPERATURE: 99 F | WEIGHT: 184.63 LBS | HEIGHT: 61 IN

## 2022-12-07 DIAGNOSIS — E55.9 VITAMIN D DEFICIENCY: ICD-10-CM

## 2022-12-07 DIAGNOSIS — Z79.899 LONG TERM CURRENT USE OF THERAPEUTIC DRUG: ICD-10-CM

## 2022-12-07 DIAGNOSIS — G47.00 INSOMNIA, UNSPECIFIED TYPE: ICD-10-CM

## 2022-12-07 DIAGNOSIS — E53.8 VITAMIN B12 DEFICIENCY: ICD-10-CM

## 2022-12-07 DIAGNOSIS — J45.20 MILD INTERMITTENT ASTHMA WITHOUT COMPLICATION: Primary | ICD-10-CM

## 2022-12-07 DIAGNOSIS — I10 ESSENTIAL HYPERTENSION: ICD-10-CM

## 2022-12-07 LAB
25(OH)D3 SERPL-MCNC: 57.2 NG/ML
CTP QC/QA: YES
POC (AMP) AMPHETAMINE: NEGATIVE
POC (BAR) BARBITURATES: NEGATIVE
POC (BUP) BUPRENORPHINE: NEGATIVE
POC (BZO) BENZODIAZEPINES: NEGATIVE
POC (COC) COCAINE: NEGATIVE
POC (MDMA) METHYLENEDIOXYMETHAMPHETAMINE 3,4: NEGATIVE
POC (MET) METHAMPHETAMINE: NEGATIVE
POC (MOP) OPIATES: NEGATIVE
POC (MTD) METHADONE: NEGATIVE
POC (OXY) OXYCODONE: NEGATIVE
POC (PCP) PHENCYCLIDINE: NEGATIVE
POC (TCA) TRICYCLIC ANTIDEPRESSANTS: NEGATIVE
POC TEMPERATURE (URINE): 90
POC THC: NEGATIVE
VIT B12 SERPL-MCNC: 537 PG/ML (ref 193–986)

## 2022-12-07 PROCEDURE — 1159F MED LIST DOCD IN RCRD: CPT | Mod: ,,, | Performed by: FAMILY MEDICINE

## 2022-12-07 PROCEDURE — 82306 VITAMIN D: ICD-10-PCS | Mod: ,,, | Performed by: CLINICAL MEDICAL LABORATORY

## 2022-12-07 PROCEDURE — 3078F PR MOST RECENT DIASTOLIC BLOOD PRESSURE < 80 MM HG: ICD-10-PCS | Mod: ,,, | Performed by: FAMILY MEDICINE

## 2022-12-07 PROCEDURE — 1159F PR MEDICATION LIST DOCUMENTED IN MEDICAL RECORD: ICD-10-PCS | Mod: ,,, | Performed by: FAMILY MEDICINE

## 2022-12-07 PROCEDURE — 3074F PR MOST RECENT SYSTOLIC BLOOD PRESSURE < 130 MM HG: ICD-10-PCS | Mod: ,,, | Performed by: FAMILY MEDICINE

## 2022-12-07 PROCEDURE — 99204 OFFICE O/P NEW MOD 45 MIN: CPT | Mod: ,,, | Performed by: FAMILY MEDICINE

## 2022-12-07 PROCEDURE — 82306 VITAMIN D 25 HYDROXY: CPT | Mod: ,,, | Performed by: CLINICAL MEDICAL LABORATORY

## 2022-12-07 PROCEDURE — 80305 DRUG TEST PRSMV DIR OPT OBS: CPT | Mod: QW,,, | Performed by: FAMILY MEDICINE

## 2022-12-07 PROCEDURE — 3008F PR BODY MASS INDEX (BMI) DOCUMENTED: ICD-10-PCS | Mod: ,,, | Performed by: FAMILY MEDICINE

## 2022-12-07 PROCEDURE — 82607 VITAMIN B12: ICD-10-PCS | Mod: ,,, | Performed by: CLINICAL MEDICAL LABORATORY

## 2022-12-07 PROCEDURE — 3078F DIAST BP <80 MM HG: CPT | Mod: ,,, | Performed by: FAMILY MEDICINE

## 2022-12-07 PROCEDURE — 3008F BODY MASS INDEX DOCD: CPT | Mod: ,,, | Performed by: FAMILY MEDICINE

## 2022-12-07 PROCEDURE — 1160F RVW MEDS BY RX/DR IN RCRD: CPT | Mod: ,,, | Performed by: FAMILY MEDICINE

## 2022-12-07 PROCEDURE — 82607 VITAMIN B-12: CPT | Mod: ,,, | Performed by: CLINICAL MEDICAL LABORATORY

## 2022-12-07 PROCEDURE — 80305 POCT URINE DRUG SCREEN PRESUMP: ICD-10-PCS | Mod: QW,,, | Performed by: FAMILY MEDICINE

## 2022-12-07 PROCEDURE — 3074F SYST BP LT 130 MM HG: CPT | Mod: ,,, | Performed by: FAMILY MEDICINE

## 2022-12-07 PROCEDURE — 99204 PR OFFICE/OUTPT VISIT, NEW, LEVL IV, 45-59 MIN: ICD-10-PCS | Mod: ,,, | Performed by: FAMILY MEDICINE

## 2022-12-07 PROCEDURE — 1160F PR REVIEW ALL MEDS BY PRESCRIBER/CLIN PHARMACIST DOCUMENTED: ICD-10-PCS | Mod: ,,, | Performed by: FAMILY MEDICINE

## 2022-12-07 RX ORDER — ZOLPIDEM TARTRATE 5 MG/1
5 TABLET ORAL NIGHTLY
Qty: 30 TABLET | Refills: 2 | Status: SHIPPED | OUTPATIENT
Start: 2022-12-07 | End: 2023-03-06 | Stop reason: SDUPTHER

## 2022-12-07 RX ORDER — BUDESONIDE AND FORMOTEROL FUMARATE DIHYDRATE 160; 4.5 UG/1; UG/1
2 AEROSOL RESPIRATORY (INHALATION) 2 TIMES DAILY
COMMUNITY
Start: 2022-09-04

## 2022-12-07 RX ORDER — ZOLPIDEM TARTRATE 10 MG/1
1 TABLET ORAL NIGHTLY PRN
COMMUNITY
Start: 2022-01-17 | End: 2022-12-07

## 2022-12-07 RX ORDER — IBUPROFEN 800 MG/1
800 TABLET ORAL EVERY 6 HOURS
COMMUNITY
Start: 2022-11-09 | End: 2024-01-19 | Stop reason: SDUPTHER

## 2022-12-07 RX ORDER — ALBUTEROL SULFATE 90 UG/1
AEROSOL, METERED RESPIRATORY (INHALATION)
Qty: 18 G | Refills: 5 | Status: SHIPPED | OUTPATIENT
Start: 2022-12-07 | End: 2023-06-12 | Stop reason: SDUPTHER

## 2022-12-07 RX ORDER — ZOLPIDEM TARTRATE 5 MG/1
5 TABLET ORAL NIGHTLY
COMMUNITY
Start: 2022-10-02 | End: 2022-12-07 | Stop reason: SDUPTHER

## 2022-12-07 RX ORDER — PHENTERMINE HYDROCHLORIDE 37.5 MG/1
37.5 TABLET ORAL DAILY
COMMUNITY
Start: 2022-09-13 | End: 2022-12-07

## 2022-12-07 RX ORDER — ERGOCALCIFEROL 1.25 MG/1
50000 CAPSULE ORAL
Qty: 90 CAPSULE | Refills: 1 | Status: SHIPPED | OUTPATIENT
Start: 2022-12-07 | End: 2023-04-21

## 2022-12-07 RX ORDER — CYCLOBENZAPRINE HCL 10 MG
10 TABLET ORAL NIGHTLY PRN
COMMUNITY
Start: 2022-07-25 | End: 2024-01-19

## 2022-12-07 NOTE — PROGRESS NOTES
New Clinic Note    Felix Smyth is a 37 y.o. female     CC:   Chief Complaint   Patient presents with    New Patient     Patient is new here states she's trying to be established.     Ears     Patient states she think she has fluid around both ears states they both stay wet. State she normally get headache at night from the fluid in ears.     B12 Injection     Patient want a B12 shot today.         Subjective    History of Present Illness   Patient complains of right ear pain for 2 weeks. She denies cough and congestion. She complains of ear discharge. She has used ear drops without relief. She needs refills on her chronic meds.     Current Outpatient Medications:     atenoloL-chlorthalidone (TENORETIC) 50-25 mg Tab, Take 0.5 tablets by mouth once daily., Disp: , Rfl:     budesonide-formoterol 160-4.5 mcg (SYMBICORT) 160-4.5 mcg/actuation HFAA, Inhale 2 puffs into the lungs 2 (two) times daily., Disp: , Rfl:     cetirizine (ZYRTEC) 10 MG tablet, TAKE 1 TABLET BY MOUTH ONCE DAILY FOR 30 DAYS FOR ITCHING, Disp: , Rfl:     CYANOCOBALAMIN, VITAMIN B-12, INJ, Inject 1 mL as directed every 30 days., Disp: , Rfl:     cyclobenzaprine (FLEXERIL) 10 MG tablet, Take 10 mg by mouth nightly as needed., Disp: , Rfl:     FLUoxetine 20 MG capsule, Take 40 mg by mouth once daily., Disp: , Rfl:     ibuprofen (ADVIL,MOTRIN) 800 MG tablet, Take 800 mg by mouth every 6 (six) hours., Disp: , Rfl:     potassium chloride SA (K-DUR,KLOR-CON) 20 MEQ tablet, Take 20 mEq by mouth once daily. 1/2 daily, Disp: , Rfl:     albuterol (PROVENTIL/VENTOLIN HFA) 90 mcg/actuation inhaler, albuterol sulfate HFA 90 mcg/actuation aerosol inhaler  Inhale 1 puff every 4-6 hours by inhalation route as needed., Disp: 18 g, Rfl: 5    ergocalciferol (ERGOCALCIFEROL) 50,000 unit Cap, Take 1 capsule (50,000 Units total) by mouth every 7 days., Disp: 90 capsule, Rfl: 1    zolpidem (AMBIEN) 5 MG Tab, Take 1 tablet (5 mg total) by mouth every evening., Disp:  "30 tablet, Rfl: 2     Past Medical History:   Diagnosis Date    Herpes simplex virus (HSV) infection     Hypertension     Mild intermittent asthma without complication 2022        Family History   Problem Relation Age of Onset    No Known Problems Father     Diabetes Mother     No Known Problems Son     No Known Problems Son     No Known Problems Daughter         Past Surgical History:   Procedure Laterality Date     SECTION      HYSTERECTOMY      LAPAROSCOPIC SALPINGO-OOPHORECTOMY Left 2021    Procedure: SALPINGO-OOPHORECTOMY, LAPAROSCOPIC;  Surgeon: Maria Tomas MD;  Location: [facility];  Service: OB/GYN;  Laterality: Left;    LAPAROSCOPIC SURGICAL REMOVAL OF ECTOPIC PREGNANCY Right     LAPAROSCOPIC TOTAL HYSTERECTOMY Bilateral 2021    Procedure: HYSTERECTOMY, TOTAL, LAPAROSCOPIC;  Surgeon: Maria Tomas MD;  Location: [facility];  Service: OB/GYN;  Laterality: Bilateral;        Review of Systems   Constitutional:  Negative for fatigue and fever.   HENT:  Positive for ear discharge and ear pain. Negative for postnasal drip, rhinorrhea and sinus pressure/congestion.    Respiratory:  Positive for shortness of breath. Negative for cough.    Cardiovascular:  Positive for chest pain. Negative for palpitations.   Gastrointestinal:  Negative for abdominal pain, diarrhea, nausea and vomiting.   Genitourinary:  Negative for dysuria.   Musculoskeletal:  Negative for neck pain.   Neurological:  Positive for headaches.      /78 (BP Location: Right arm, Patient Position: Sitting, BP Method: Large (Manual))   Pulse 99   Temp 98.6 °F (37 °C) (Oral)   Resp 17   Ht 5' 1" (1.549 m)   Wt 83.7 kg (184 lb 9.6 oz)   LMP  (LMP Unknown)   SpO2 97%   BMI 34.88 kg/m²      Physical Exam  HENT:      Head: Normocephalic and atraumatic.      Right Ear: Tympanic membrane, ear canal and external ear normal.      Left Ear: Tympanic membrane, ear canal and external ear normal. "   Cardiovascular:      Rate and Rhythm: Normal rate and regular rhythm.   Pulmonary:      Effort: Pulmonary effort is normal.      Breath sounds: Normal breath sounds.   Neurological:      Mental Status: She is alert and oriented to person, place, and time.   Psychiatric:         Mood and Affect: Mood normal.         Behavior: Behavior normal.        Assessment and Plan      ICD-10-CM ICD-9-CM   1. Mild intermittent asthma without complication  J45.20 493.90   2. Essential hypertension  I10 401.9   3. Insomnia, unspecified type  G47.00 780.52   4. Vitamin D deficiency  E55.9 268.9   5. Vitamin B12 deficiency  E53.8 266.2   6. Long term current use of therapeutic drug  Z79.899 V58.69        Problem List Items Addressed This Visit          Pulmonary    Mild intermittent asthma without complication - Primary    Relevant Medications    budesonide-formoterol 160-4.5 mcg (SYMBICORT) 160-4.5 mcg/actuation HFAA    albuterol (PROVENTIL/VENTOLIN HFA) 90 mcg/actuation inhaler       Cardiac/Vascular    Essential hypertension       Endocrine    Vitamin D deficiency    Relevant Medications    ergocalciferol (ERGOCALCIFEROL) 50,000 unit Cap    Other Relevant Orders    Vitamin D (Completed)    Vitamin B12 deficiency    Relevant Medications    CYANOCOBALAMIN, VITAMIN B-12, INJ    Other Relevant Orders    Vitamin B12 (Completed)       Other    Insomnia    Relevant Medications    zolpidem (AMBIEN) 5 MG Tab     Other Visit Diagnoses       Long term current use of therapeutic drug        Relevant Orders    POCT Urine Drug Screen Presump (Completed)            and UDS reviewed and consistent.     Follow up in about 3 months (around 3/7/2023).

## 2022-12-27 PROBLEM — G47.00 INSOMNIA: Status: ACTIVE | Noted: 2022-12-27

## 2022-12-27 PROBLEM — I10 ESSENTIAL HYPERTENSION: Status: ACTIVE | Noted: 2022-12-27

## 2022-12-27 PROBLEM — E53.8 VITAMIN B12 DEFICIENCY: Status: ACTIVE | Noted: 2022-12-27

## 2022-12-27 PROBLEM — E55.9 VITAMIN D DEFICIENCY: Status: ACTIVE | Noted: 2022-12-27

## 2023-03-06 DIAGNOSIS — G47.00 INSOMNIA, UNSPECIFIED TYPE: ICD-10-CM

## 2023-03-06 RX ORDER — ZOLPIDEM TARTRATE 5 MG/1
5 TABLET ORAL NIGHTLY
Qty: 5 TABLET | Refills: 0 | Status: SHIPPED | OUTPATIENT
Start: 2023-03-06 | End: 2023-03-15 | Stop reason: SDUPTHER

## 2023-03-06 NOTE — TELEPHONE ENCOUNTER
Patient had an apt with Dr. Galeas today for a refill on Ambien. Dr. Galeas is out of the office today. She nneds 5 tabs to get her through until she can get in to see Dr. Galeas. See med cart to refill if this is OK.

## 2023-03-15 ENCOUNTER — OFFICE VISIT (OUTPATIENT)
Dept: FAMILY MEDICINE | Facility: CLINIC | Age: 38
End: 2023-03-15
Payer: COMMERCIAL

## 2023-03-15 VITALS
HEART RATE: 84 BPM | RESPIRATION RATE: 18 BRPM | BODY MASS INDEX: 34.74 KG/M2 | HEIGHT: 61 IN | OXYGEN SATURATION: 96 % | WEIGHT: 184 LBS | DIASTOLIC BLOOD PRESSURE: 84 MMHG | SYSTOLIC BLOOD PRESSURE: 116 MMHG | TEMPERATURE: 99 F

## 2023-03-15 DIAGNOSIS — Z79.899 LONG TERM USE OF DRUG: ICD-10-CM

## 2023-03-15 DIAGNOSIS — K59.09 CHRONIC CONSTIPATION: ICD-10-CM

## 2023-03-15 DIAGNOSIS — I10 PRIMARY HYPERTENSION: Primary | ICD-10-CM

## 2023-03-15 DIAGNOSIS — F32.89 OTHER DEPRESSION: ICD-10-CM

## 2023-03-15 DIAGNOSIS — Z80.0 FAMILY HISTORY OF COLON CANCER: ICD-10-CM

## 2023-03-15 DIAGNOSIS — G47.00 INSOMNIA, UNSPECIFIED TYPE: ICD-10-CM

## 2023-03-15 LAB
ALBUMIN SERPL BCP-MCNC: 3.9 G/DL (ref 3.5–5)
ALBUMIN/GLOB SERPL: 1 {RATIO}
ALP SERPL-CCNC: 74 U/L (ref 37–98)
ALT SERPL W P-5'-P-CCNC: 41 U/L (ref 13–56)
ANION GAP SERPL CALCULATED.3IONS-SCNC: 10 MMOL/L (ref 7–16)
AST SERPL W P-5'-P-CCNC: 16 U/L (ref 15–37)
BASOPHILS # BLD AUTO: 0.05 K/UL (ref 0–0.2)
BASOPHILS NFR BLD AUTO: 0.2 % (ref 0–1)
BILIRUB SERPL-MCNC: 0.3 MG/DL (ref ?–1.2)
BUN SERPL-MCNC: 14 MG/DL (ref 7–18)
BUN/CREAT SERPL: 18 (ref 6–20)
CALCIUM SERPL-MCNC: 10 MG/DL (ref 8.5–10.1)
CHLORIDE SERPL-SCNC: 101 MMOL/L (ref 98–107)
CHOLEST SERPL-MCNC: 200 MG/DL (ref 0–200)
CHOLEST/HDLC SERPL: 3.4 {RATIO}
CO2 SERPL-SCNC: 29 MMOL/L (ref 21–32)
CREAT SERPL-MCNC: 0.77 MG/DL (ref 0.55–1.02)
CTP QC/QA: YES
DIFFERENTIAL METHOD BLD: ABNORMAL
EGFR (NO RACE VARIABLE) (RUSH/TITUS): 101 ML/MIN/1.73M²
EOSINOPHIL # BLD AUTO: 0.06 K/UL (ref 0–0.5)
EOSINOPHIL NFR BLD AUTO: 0.3 % (ref 1–4)
ERYTHROCYTE [DISTWIDTH] IN BLOOD BY AUTOMATED COUNT: 13.7 % (ref 11.5–14.5)
GLOBULIN SER-MCNC: 4 G/DL (ref 2–4)
GLUCOSE SERPL-MCNC: 83 MG/DL (ref 74–106)
HCT VFR BLD AUTO: 40.4 % (ref 38–47)
HDLC SERPL-MCNC: 59 MG/DL (ref 40–60)
HGB BLD-MCNC: 13.3 G/DL (ref 12–16)
IMM GRANULOCYTES # BLD AUTO: 0.3 K/UL (ref 0–0.04)
IMM GRANULOCYTES NFR BLD: 1.5 % (ref 0–0.4)
LDLC SERPL CALC-MCNC: 116 MG/DL
LDLC/HDLC SERPL: 2 {RATIO}
LYMPHOCYTES # BLD AUTO: 3.73 K/UL (ref 1–4.8)
LYMPHOCYTES NFR BLD AUTO: 18.2 % (ref 27–41)
MCH RBC QN AUTO: 28.6 PG (ref 27–31)
MCHC RBC AUTO-ENTMCNC: 32.9 G/DL (ref 32–36)
MCV RBC AUTO: 86.9 FL (ref 80–96)
MONOCYTES # BLD AUTO: 1.52 K/UL (ref 0–0.8)
MONOCYTES NFR BLD AUTO: 7.4 % (ref 2–6)
MPC BLD CALC-MCNC: 8.8 FL (ref 9.4–12.4)
NEUTROPHILS # BLD AUTO: 14.87 K/UL (ref 1.8–7.7)
NEUTROPHILS NFR BLD AUTO: 72.4 % (ref 53–65)
NONHDLC SERPL-MCNC: 141 MG/DL
NRBC # BLD AUTO: 0 X10E3/UL
NRBC, AUTO (.00): 0 %
PLATELET # BLD AUTO: 446 K/UL (ref 150–400)
POC (AMP) AMPHETAMINE: NEGATIVE
POC (BAR) BARBITURATES: NEGATIVE
POC (BUP) BUPRENORPHINE: NEGATIVE
POC (BZO) BENZODIAZEPINES: ABNORMAL
POC (COC) COCAINE: NEGATIVE
POC (MDMA) METHYLENEDIOXYMETHAMPHETAMINE 3,4: NEGATIVE
POC (MET) METHAMPHETAMINE: NEGATIVE
POC (MOP) OPIATES: NEGATIVE
POC (MTD) METHADONE: NEGATIVE
POC (OXY) OXYCODONE: NEGATIVE
POC (PCP) PHENCYCLIDINE: NEGATIVE
POC (TCA) TRICYCLIC ANTIDEPRESSANTS: NEGATIVE
POC TEMPERATURE (URINE): 90
POC THC: NEGATIVE
POTASSIUM SERPL-SCNC: 4.7 MMOL/L (ref 3.5–5.1)
PROT SERPL-MCNC: 7.9 G/DL (ref 6.4–8.2)
RBC # BLD AUTO: 4.65 M/UL (ref 4.2–5.4)
SODIUM SERPL-SCNC: 135 MMOL/L (ref 136–145)
TRIGL SERPL-MCNC: 125 MG/DL (ref 35–150)
VLDLC SERPL-MCNC: 25 MG/DL
WBC # BLD AUTO: 20.53 K/UL (ref 4.5–11)

## 2023-03-15 PROCEDURE — 3074F SYST BP LT 130 MM HG: CPT | Mod: ,,, | Performed by: FAMILY MEDICINE

## 2023-03-15 PROCEDURE — 80053 COMPREHEN METABOLIC PANEL: CPT | Mod: ,,, | Performed by: CLINICAL MEDICAL LABORATORY

## 2023-03-15 PROCEDURE — 80061 LIPID PANEL: CPT | Mod: ,,, | Performed by: CLINICAL MEDICAL LABORATORY

## 2023-03-15 PROCEDURE — 3079F PR MOST RECENT DIASTOLIC BLOOD PRESSURE 80-89 MM HG: ICD-10-PCS | Mod: ,,, | Performed by: FAMILY MEDICINE

## 2023-03-15 PROCEDURE — 3008F PR BODY MASS INDEX (BMI) DOCUMENTED: ICD-10-PCS | Mod: ,,, | Performed by: FAMILY MEDICINE

## 2023-03-15 PROCEDURE — 80305 DRUG TEST PRSMV DIR OPT OBS: CPT | Mod: QW,,, | Performed by: FAMILY MEDICINE

## 2023-03-15 PROCEDURE — 99214 PR OFFICE/OUTPT VISIT, EST, LEVL IV, 30-39 MIN: ICD-10-PCS | Mod: ,,, | Performed by: FAMILY MEDICINE

## 2023-03-15 PROCEDURE — 3008F BODY MASS INDEX DOCD: CPT | Mod: ,,, | Performed by: FAMILY MEDICINE

## 2023-03-15 PROCEDURE — 99214 OFFICE O/P EST MOD 30 MIN: CPT | Mod: ,,, | Performed by: FAMILY MEDICINE

## 2023-03-15 PROCEDURE — 80305 POCT URINE DRUG SCREEN PRESUMP: ICD-10-PCS | Mod: QW,,, | Performed by: FAMILY MEDICINE

## 2023-03-15 PROCEDURE — 3079F DIAST BP 80-89 MM HG: CPT | Mod: ,,, | Performed by: FAMILY MEDICINE

## 2023-03-15 PROCEDURE — 80061 LIPID PANEL: ICD-10-PCS | Mod: ,,, | Performed by: CLINICAL MEDICAL LABORATORY

## 2023-03-15 PROCEDURE — 85025 COMPLETE CBC W/AUTO DIFF WBC: CPT | Mod: ,,, | Performed by: CLINICAL MEDICAL LABORATORY

## 2023-03-15 PROCEDURE — 3074F PR MOST RECENT SYSTOLIC BLOOD PRESSURE < 130 MM HG: ICD-10-PCS | Mod: ,,, | Performed by: FAMILY MEDICINE

## 2023-03-15 PROCEDURE — 80053 COMPREHENSIVE METABOLIC PANEL: ICD-10-PCS | Mod: ,,, | Performed by: CLINICAL MEDICAL LABORATORY

## 2023-03-15 PROCEDURE — 85025 CBC WITH DIFFERENTIAL: ICD-10-PCS | Mod: ,,, | Performed by: CLINICAL MEDICAL LABORATORY

## 2023-03-15 RX ORDER — FLUOXETINE HYDROCHLORIDE 20 MG/1
CAPSULE ORAL
Qty: 90 CAPSULE | Refills: 5 | Status: SHIPPED | OUTPATIENT
Start: 2023-03-15 | End: 2023-06-02 | Stop reason: SDUPTHER

## 2023-03-15 RX ORDER — TIRZEPATIDE 7.5 MG/.5ML
INJECTION, SOLUTION SUBCUTANEOUS
COMMUNITY
Start: 2023-02-16 | End: 2024-01-19

## 2023-03-15 RX ORDER — FLUCONAZOLE 150 MG/1
150 TABLET ORAL DAILY
Qty: 1 TABLET | Refills: 0 | Status: SHIPPED | OUTPATIENT
Start: 2023-03-15 | End: 2023-03-16

## 2023-03-15 RX ORDER — ATENOLOL AND CHLORTHALIDONE TABLET 50; 25 MG/1; MG/1
1 TABLET ORAL DAILY
Qty: 90 TABLET | Refills: 1 | Status: SHIPPED | OUTPATIENT
Start: 2023-03-15 | End: 2023-06-12 | Stop reason: SDUPTHER

## 2023-03-15 RX ORDER — ZOLPIDEM TARTRATE 5 MG/1
5 TABLET ORAL NIGHTLY
Qty: 30 TABLET | Refills: 0 | Status: SHIPPED | OUTPATIENT
Start: 2023-03-15 | End: 2023-04-21 | Stop reason: SDUPTHER

## 2023-03-15 RX ORDER — FLUOXETINE HYDROCHLORIDE 20 MG/1
40 CAPSULE ORAL DAILY
Qty: 90 CAPSULE | Refills: 1 | Status: SHIPPED | OUTPATIENT
Start: 2023-03-15 | End: 2023-03-15 | Stop reason: DRUGHIGH

## 2023-03-27 LAB — BEAKER SEE SCANNED REPORT: NORMAL

## 2023-04-20 ENCOUNTER — TELEPHONE (OUTPATIENT)
Dept: FAMILY MEDICINE | Facility: CLINIC | Age: 38
End: 2023-04-20
Payer: MEDICAID

## 2023-04-20 NOTE — TELEPHONE ENCOUNTER
Attempted to call all three numbers available in Epic even her spouse to let her know not to come in at 2:00 because her PCP was out sick today but unable to contact or leave a voice message. Even sent her a text message.

## 2023-04-21 ENCOUNTER — OFFICE VISIT (OUTPATIENT)
Dept: FAMILY MEDICINE | Facility: CLINIC | Age: 38
End: 2023-04-21
Payer: COMMERCIAL

## 2023-04-21 VITALS
HEIGHT: 61 IN | HEART RATE: 81 BPM | RESPIRATION RATE: 20 BRPM | OXYGEN SATURATION: 97 % | BODY MASS INDEX: 35.43 KG/M2 | SYSTOLIC BLOOD PRESSURE: 112 MMHG | WEIGHT: 187.63 LBS | DIASTOLIC BLOOD PRESSURE: 80 MMHG | TEMPERATURE: 98 F

## 2023-04-21 DIAGNOSIS — G47.00 INSOMNIA, UNSPECIFIED TYPE: Primary | ICD-10-CM

## 2023-04-21 DIAGNOSIS — D72.829 LEUKOCYTOSIS, UNSPECIFIED TYPE: ICD-10-CM

## 2023-04-21 LAB
BASOPHILS # BLD AUTO: 0.03 K/UL (ref 0–0.2)
BASOPHILS NFR BLD AUTO: 0.3 % (ref 0–1)
DIFFERENTIAL METHOD BLD: ABNORMAL
EOSINOPHIL # BLD AUTO: 0.14 K/UL (ref 0–0.5)
EOSINOPHIL NFR BLD AUTO: 1.3 % (ref 1–4)
ERYTHROCYTE [DISTWIDTH] IN BLOOD BY AUTOMATED COUNT: 14.4 % (ref 11.5–14.5)
HCT VFR BLD AUTO: 37.6 % (ref 38–47)
HGB BLD-MCNC: 12.2 G/DL (ref 12–16)
IMM GRANULOCYTES # BLD AUTO: 0.09 K/UL (ref 0–0.04)
IMM GRANULOCYTES NFR BLD: 0.9 % (ref 0–0.4)
LYMPHOCYTES # BLD AUTO: 3.21 K/UL (ref 1–4.8)
LYMPHOCYTES NFR BLD AUTO: 30.6 % (ref 27–41)
MCH RBC QN AUTO: 28.2 PG (ref 27–31)
MCHC RBC AUTO-ENTMCNC: 32.4 G/DL (ref 32–36)
MCV RBC AUTO: 87 FL (ref 80–96)
MONOCYTES # BLD AUTO: 0.75 K/UL (ref 0–0.8)
MONOCYTES NFR BLD AUTO: 7.1 % (ref 2–6)
MPC BLD CALC-MCNC: 8.9 FL (ref 9.4–12.4)
NEUTROPHILS # BLD AUTO: 6.28 K/UL (ref 1.8–7.7)
NEUTROPHILS NFR BLD AUTO: 59.8 % (ref 53–65)
NRBC # BLD AUTO: 0 X10E3/UL
NRBC, AUTO (.00): 0 %
PLATELET # BLD AUTO: 393 K/UL (ref 150–400)
RBC # BLD AUTO: 4.32 M/UL (ref 4.2–5.4)
WBC # BLD AUTO: 10.5 K/UL (ref 4.5–11)

## 2023-04-21 PROCEDURE — 85025 CBC WITH DIFFERENTIAL: ICD-10-PCS | Mod: ,,, | Performed by: CLINICAL MEDICAL LABORATORY

## 2023-04-21 PROCEDURE — 3079F DIAST BP 80-89 MM HG: CPT | Mod: ,,, | Performed by: FAMILY MEDICINE

## 2023-04-21 PROCEDURE — 99213 OFFICE O/P EST LOW 20 MIN: CPT | Mod: ,,, | Performed by: FAMILY MEDICINE

## 2023-04-21 PROCEDURE — 3008F PR BODY MASS INDEX (BMI) DOCUMENTED: ICD-10-PCS | Mod: ,,, | Performed by: FAMILY MEDICINE

## 2023-04-21 PROCEDURE — 3008F BODY MASS INDEX DOCD: CPT | Mod: ,,, | Performed by: FAMILY MEDICINE

## 2023-04-21 PROCEDURE — 85025 COMPLETE CBC W/AUTO DIFF WBC: CPT | Mod: ,,, | Performed by: CLINICAL MEDICAL LABORATORY

## 2023-04-21 PROCEDURE — 3074F SYST BP LT 130 MM HG: CPT | Mod: ,,, | Performed by: FAMILY MEDICINE

## 2023-04-21 PROCEDURE — 3074F PR MOST RECENT SYSTOLIC BLOOD PRESSURE < 130 MM HG: ICD-10-PCS | Mod: ,,, | Performed by: FAMILY MEDICINE

## 2023-04-21 PROCEDURE — 99213 PR OFFICE/OUTPT VISIT, EST, LEVL III, 20-29 MIN: ICD-10-PCS | Mod: ,,, | Performed by: FAMILY MEDICINE

## 2023-04-21 PROCEDURE — 3079F PR MOST RECENT DIASTOLIC BLOOD PRESSURE 80-89 MM HG: ICD-10-PCS | Mod: ,,, | Performed by: FAMILY MEDICINE

## 2023-04-21 PROCEDURE — 1159F PR MEDICATION LIST DOCUMENTED IN MEDICAL RECORD: ICD-10-PCS | Mod: ,,, | Performed by: FAMILY MEDICINE

## 2023-04-21 PROCEDURE — 1160F RVW MEDS BY RX/DR IN RCRD: CPT | Mod: ,,, | Performed by: FAMILY MEDICINE

## 2023-04-21 PROCEDURE — 1160F PR REVIEW ALL MEDS BY PRESCRIBER/CLIN PHARMACIST DOCUMENTED: ICD-10-PCS | Mod: ,,, | Performed by: FAMILY MEDICINE

## 2023-04-21 PROCEDURE — 1159F MED LIST DOCD IN RCRD: CPT | Mod: ,,, | Performed by: FAMILY MEDICINE

## 2023-04-21 RX ORDER — ZOLPIDEM TARTRATE 5 MG/1
5 TABLET ORAL NIGHTLY
Qty: 30 TABLET | Refills: 1 | Status: SHIPPED | OUTPATIENT
Start: 2023-04-21 | End: 2023-06-02 | Stop reason: SDUPTHER

## 2023-04-21 RX ORDER — TIRZEPATIDE 5 MG/.5ML
INJECTION, SOLUTION SUBCUTANEOUS
COMMUNITY
Start: 2023-03-30 | End: 2024-01-19

## 2023-04-21 NOTE — PROGRESS NOTES
New Clinic Note    Felix Smyth is a 37 y.o. female     CC:   Chief Complaint   Patient presents with    Follow-up     Abnormal lab, WBC    Medication Refill    Insomnia        Subjective    History of Present Illness HPI   Patient is for evaluation of chronic medical problems. Patient needs refills. Felix  is tolerating medications well without side effects.  Her white blood cells were elevated at her last visit. She reports that she was sick at that visit. Will recheck today.      Current Outpatient Medications:     albuterol (PROVENTIL/VENTOLIN HFA) 90 mcg/actuation inhaler, albuterol sulfate HFA 90 mcg/actuation aerosol inhaler  Inhale 1 puff every 4-6 hours by inhalation route as needed., Disp: 18 g, Rfl: 5    atenoloL-chlorthalidone (TENORETIC) 50-25 mg Tab, Take 1 tablet by mouth once daily., Disp: 90 tablet, Rfl: 1    budesonide-formoterol 160-4.5 mcg (SYMBICORT) 160-4.5 mcg/actuation HFAA, Inhale 2 puffs into the lungs 2 (two) times daily., Disp: , Rfl:     cetirizine (ZYRTEC) 10 MG tablet, TAKE 1 TABLET BY MOUTH ONCE DAILY FOR 30 DAYS FOR ITCHING, Disp: , Rfl:     FLUoxetine 20 MG capsule, Take 3 tablets by mouth daily, Disp: 90 capsule, Rfl: 5    ibuprofen (ADVIL,MOTRIN) 800 MG tablet, Take 800 mg by mouth every 6 (six) hours., Disp: , Rfl:     MOUNJARO 5 mg/0.5 mL PnIj, SMARTSI Milligram(s) SUB-Q Once a Week, Disp: , Rfl:     MOUNJARO 7.5 mg/0.5 mL PnIj, SMARTSI.5 Milligram(s) SUB-Q Once a Week, Disp: , Rfl:     CYANOCOBALAMIN, VITAMIN B-12, INJ, Inject 1 mL as directed every 30 days., Disp: , Rfl:     cyclobenzaprine (FLEXERIL) 10 MG tablet, Take 10 mg by mouth nightly as needed., Disp: , Rfl:     potassium chloride SA (K-DUR,KLOR-CON) 20 MEQ tablet, Take 20 mEq by mouth once daily. 1/2 daily, Disp: , Rfl:     zolpidem (AMBIEN) 5 MG Tab, Take 1 tablet (5 mg total) by mouth every evening., Disp: 30 tablet, Rfl: 1     Past Medical History:   Diagnosis Date    Herpes simplex virus (HSV)  "infection     Hypertension     Mild intermittent asthma without complication 2022        Family History   Problem Relation Age of Onset    No Known Problems Father     Diabetes Mother     No Known Problems Son     No Known Problems Son     No Known Problems Daughter         Past Surgical History:   Procedure Laterality Date     SECTION      HYSTERECTOMY      LAPAROSCOPIC SALPINGO-OOPHORECTOMY Left 2021    Procedure: SALPINGO-OOPHORECTOMY, LAPAROSCOPIC;  Surgeon: Maria Tomas MD;  Location: [facility] OR;  Service: OB/GYN;  Laterality: Left;    LAPAROSCOPIC SURGICAL REMOVAL OF ECTOPIC PREGNANCY Right     LAPAROSCOPIC TOTAL HYSTERECTOMY Bilateral 2021    Procedure: HYSTERECTOMY, TOTAL, LAPAROSCOPIC;  Surgeon: Maria Tomas MD;  Location: [facility] OR;  Service: OB/GYN;  Laterality: Bilateral;        Review of Systems   Constitutional:  Negative for fatigue and fever.   HENT:  Negative for ear pain, postnasal drip, rhinorrhea and sinus pressure/congestion.    Respiratory:  Negative for cough and shortness of breath.    Cardiovascular:  Negative for chest pain.   Gastrointestinal:  Negative for abdominal pain, diarrhea, nausea and vomiting.   Genitourinary:  Negative for dysuria.   Neurological:  Negative for headaches.      /80 (BP Location: Right arm, Patient Position: Sitting, BP Method: Large (Automatic))   Pulse 81   Temp 97.9 °F (36.6 °C) (Oral)   Resp 20   Ht 5' 1" (1.549 m)   Wt 85.1 kg (187 lb 9.6 oz)   LMP  (LMP Unknown)   SpO2 97%   BMI 35.45 kg/m²      Physical Exam  HENT:      Head: Normocephalic and atraumatic.   Cardiovascular:      Rate and Rhythm: Normal rate and regular rhythm.   Pulmonary:      Effort: Pulmonary effort is normal.      Breath sounds: Normal breath sounds.   Neurological:      Mental Status: She is alert and oriented to person, place, and time.   Psychiatric:         Mood and Affect: Mood normal.         Behavior: Behavior normal. "        Assessment and Plan      ICD-10-CM ICD-9-CM   1. Insomnia, unspecified type  G47.00 780.52   2. Leukocytosis, unspecified type  D72.829 288.60        1. Insomnia, unspecified type  The current medical regimen is effective;  continue present plan and medications.  reviewed and consistent.   -     zolpidem (AMBIEN) 5 MG Tab; Take 1 tablet (5 mg total) by mouth every evening.  Dispense: 30 tablet; Refill: 1    2. Leukocytosis, unspecified type  Recheck today.   -     CBC Auto Differential; Future; Expected date: 04/21/2023      Follow up in about 2 months (around 6/21/2023).

## 2023-04-27 PROBLEM — J45.20 MILD INTERMITTENT ASTHMA WITHOUT COMPLICATION: Chronic | Status: ACTIVE | Noted: 2022-12-07

## 2023-04-27 PROBLEM — G47.00 INSOMNIA: Chronic | Status: ACTIVE | Noted: 2022-12-27

## 2023-04-27 PROBLEM — E55.9 VITAMIN D DEFICIENCY: Chronic | Status: ACTIVE | Noted: 2022-12-27

## 2023-04-27 PROBLEM — E53.8 VITAMIN B12 DEFICIENCY: Chronic | Status: ACTIVE | Noted: 2022-12-27

## 2023-04-27 PROBLEM — I10 ESSENTIAL HYPERTENSION: Chronic | Status: ACTIVE | Noted: 2022-12-27

## 2023-04-28 NOTE — PROGRESS NOTES
New Clinic Note    Felix OswaldRashad is a 37 y.o. female     CC:   Chief Complaint   Patient presents with    Annual Exam     Patient stated she is here for a general 6 month health evaluation, renewal of prescriptions sent to [facility], and is not fasting for labs. Due for LIPID panel and her last Vit D was 57 and wants to ask if she really needs prescription strength 50,000 units weekly or just get OTC.     Asthma    Depression    Insomnia    Medication Refill    Vaginitis     Has been on antibiotic for sinus/bronchitis and stated she needs Diflucan for probably yeast infection.         Subjective    History of Present Illness   Patient is for evaluation of chronic medical problems. Patient needs refills. Felix  is tolerating medications well without side effects. She has recently been treated with antibiotics for bronchitis and has a yeast infection. Patient complains of chronic constipation. She has a family history of colon cancer and is concerned.     Current Outpatient Medications:     albuterol (PROVENTIL/VENTOLIN HFA) 90 mcg/actuation inhaler, albuterol sulfate HFA 90 mcg/actuation aerosol inhaler  Inhale 1 puff every 4-6 hours by inhalation route as needed., Disp: 18 g, Rfl: 5    budesonide-formoterol 160-4.5 mcg (SYMBICORT) 160-4.5 mcg/actuation HFAA, Inhale 2 puffs into the lungs 2 (two) times daily., Disp: , Rfl:     cetirizine (ZYRTEC) 10 MG tablet, TAKE 1 TABLET BY MOUTH ONCE DAILY FOR 30 DAYS FOR ITCHING, Disp: , Rfl:     ibuprofen (ADVIL,MOTRIN) 800 MG tablet, Take 800 mg by mouth every 6 (six) hours., Disp: , Rfl:     MOUNJARO 7.5 mg/0.5 mL PnIj, SMARTSI.5 Milligram(s) SUB-Q Once a Week, Disp: , Rfl:     atenoloL-chlorthalidone (TENORETIC) 50-25 mg Tab, Take 1 tablet by mouth once daily., Disp: 90 tablet, Rfl: 1    CYANOCOBALAMIN, VITAMIN B-12, INJ, Inject 1 mL as directed every 30 days., Disp: , Rfl:     cyclobenzaprine (FLEXERIL) 10 MG tablet, Take 10 mg by mouth nightly as  needed., Disp: , Rfl:     FLUoxetine 20 MG capsule, Take 3 tablets by mouth daily, Disp: 90 capsule, Rfl: 5    MOUNJARO 5 mg/0.5 mL PnIj, SMARTSI Milligram(s) SUB-Q Once a Week, Disp: , Rfl:     potassium chloride SA (K-DUR,KLOR-CON) 20 MEQ tablet, Take 20 mEq by mouth once daily. 1/2 daily, Disp: , Rfl:     zolpidem (AMBIEN) 5 MG Tab, Take 1 tablet (5 mg total) by mouth every evening., Disp: 30 tablet, Rfl: 1     Past Medical History:   Diagnosis Date    Herpes simplex virus (HSV) infection     Hypertension     Mild intermittent asthma without complication 2022        Family History   Problem Relation Age of Onset    No Known Problems Father     Diabetes Mother     No Known Problems Son     No Known Problems Son     No Known Problems Daughter         Past Surgical History:   Procedure Laterality Date     SECTION      HYSTERECTOMY      LAPAROSCOPIC SALPINGO-OOPHORECTOMY Left 2021    Procedure: SALPINGO-OOPHORECTOMY, LAPAROSCOPIC;  Surgeon: Maria Tomas MD;  Location: [facility] OR;  Service: OB/GYN;  Laterality: Left;    LAPAROSCOPIC SURGICAL REMOVAL OF ECTOPIC PREGNANCY Right     LAPAROSCOPIC TOTAL HYSTERECTOMY Bilateral 2021    Procedure: HYSTERECTOMY, TOTAL, LAPAROSCOPIC;  Surgeon: Maria Tomas MD;  Location: [facility];  Service: OB/GYN;  Laterality: Bilateral;        Review of Systems   Constitutional:  Negative for activity change, fatigue, fever and unexpected weight change.   HENT:  Negative for ear pain, hearing loss, postnasal drip, rhinorrhea, sinus pressure/congestion and trouble swallowing.    Eyes:  Negative for discharge and visual disturbance.   Respiratory:  Negative for cough, chest tightness, shortness of breath and wheezing.    Cardiovascular:  Negative for chest pain and palpitations.   Gastrointestinal:  Negative for abdominal pain, blood in stool, constipation, diarrhea, nausea and vomiting.   Endocrine: Negative for polydipsia and polyuria.  "  Genitourinary:  Negative for difficulty urinating, dysuria, hematuria and menstrual problem.   Musculoskeletal:  Negative for arthralgias, joint swelling and neck pain.   Neurological:  Negative for weakness and headaches.   Psychiatric/Behavioral:  Negative for confusion and dysphoric mood.       /84 (BP Location: Left arm, Patient Position: Sitting, BP Method: Large (Automatic))   Pulse 84   Temp 98.9 °F (37.2 °C) (Oral)   Resp 18   Ht 5' 1" (1.549 m)   Wt 83.5 kg (184 lb)   LMP  (LMP Unknown)   SpO2 96%   BMI 34.77 kg/m²      Physical Exam  HENT:      Head: Normocephalic and atraumatic.   Cardiovascular:      Rate and Rhythm: Normal rate and regular rhythm.   Pulmonary:      Effort: Pulmonary effort is normal.      Breath sounds: Normal breath sounds.   Neurological:      Mental Status: She is alert and oriented to person, place, and time.   Psychiatric:         Mood and Affect: Mood normal.         Behavior: Behavior normal.        Assessment and Plan      ICD-10-CM ICD-9-CM   1. Primary hypertension  I10 401.9   2. Insomnia, unspecified type  G47.00 780.52   3. Other depression  F32.89 311   4. Long term use of drug  Z79.899 V58.69   5. Chronic constipation  K59.09 564.00   6. Family history of colon cancer  Z80.0 V16.0        1. Primary hypertension  The current medical regimen is effective;  continue present plan and medications.  -     atenoloL-chlorthalidone (TENORETIC) 50-25 mg Tab; Take 1 tablet by mouth once daily.  Dispense: 90 tablet; Refill: 1  -     Comprehensive Metabolic Panel; Future; Expected date: 03/15/2023  -     CBC Auto Differential; Future; Expected date: 03/15/2023  -     Lipid Panel; Future; Expected date: 03/15/2023    2. Insomnia, unspecified type  The current medical regimen is effective;  continue present plan and medications.  -     Discontinue: zolpidem (AMBIEN) 5 MG Tab; Take 1 tablet (5 mg total) by mouth every evening.  Dispense: 30 tablet; Refill: 0  -     POCT " Urine Drug Screen Presump  -     Miscellaneous Test, Sendout NAVIS BENZO CONF    3. Other depression  The current medical regimen is effective;  continue present plan and medications.  -     Discontinue: FLUoxetine 20 MG capsule; Take 2 capsules (40 mg total) by mouth once daily.  Dispense: 90 capsule; Refill: 1  -     FLUoxetine 20 MG capsule; Take 3 tablets by mouth daily  Dispense: 90 capsule; Refill: 5    4. Long term use of drug   reviewed and consistent. UDS was positive for benzos. Patient reports that she did not take anything she was not prescribed. Will send urine for definitive test. Dorcas wrote for one month until test comes back.   -     Cancel: Benzodiazepine Confirmation, Urine  -     Miscellaneous Test, Sendout NAVIS BENZO CONF    5. Chronic constipation  -     Ambulatory referral/consult to Gastroenterology; Future; Expected date: 03/22/2023    6. Family history of colon cancer  -     Ambulatory referral/consult to Gastroenterology; Future; Expected date: 03/22/2023    Other orders  -     fluconazole (DIFLUCAN) 150 MG Tab; Take 1 tablet (150 mg total) by mouth once daily. for 1 day  Dispense: 1 tablet; Refill: 0         Follow up in about 3 months (around 6/15/2023).

## 2023-06-02 DIAGNOSIS — G47.00 INSOMNIA, UNSPECIFIED TYPE: ICD-10-CM

## 2023-06-02 DIAGNOSIS — F32.89 OTHER DEPRESSION: ICD-10-CM

## 2023-06-02 RX ORDER — ZOLPIDEM TARTRATE 5 MG/1
5 TABLET ORAL NIGHTLY
Qty: 30 TABLET | Refills: 0 | Status: SHIPPED | OUTPATIENT
Start: 2023-06-02 | End: 2023-06-12 | Stop reason: SDUPTHER

## 2023-06-02 RX ORDER — FLUOXETINE HYDROCHLORIDE 20 MG/1
CAPSULE ORAL
Qty: 90 CAPSULE | Refills: 0 | Status: SHIPPED | OUTPATIENT
Start: 2023-06-02 | End: 2023-06-12 | Stop reason: SDUPTHER

## 2023-06-12 ENCOUNTER — OFFICE VISIT (OUTPATIENT)
Dept: FAMILY MEDICINE | Facility: CLINIC | Age: 38
End: 2023-06-12
Payer: COMMERCIAL

## 2023-06-12 VITALS
HEIGHT: 61 IN | HEART RATE: 87 BPM | DIASTOLIC BLOOD PRESSURE: 84 MMHG | SYSTOLIC BLOOD PRESSURE: 119 MMHG | WEIGHT: 181 LBS | BODY MASS INDEX: 34.17 KG/M2 | TEMPERATURE: 98 F | RESPIRATION RATE: 18 BRPM | OXYGEN SATURATION: 96 %

## 2023-06-12 DIAGNOSIS — J45.20 MILD INTERMITTENT ASTHMA WITHOUT COMPLICATION: ICD-10-CM

## 2023-06-12 DIAGNOSIS — I10 PRIMARY HYPERTENSION: Primary | ICD-10-CM

## 2023-06-12 DIAGNOSIS — Z79.899 LONG TERM USE OF DRUG: ICD-10-CM

## 2023-06-12 DIAGNOSIS — G47.00 INSOMNIA, UNSPECIFIED TYPE: ICD-10-CM

## 2023-06-12 DIAGNOSIS — F32.89 OTHER DEPRESSION: ICD-10-CM

## 2023-06-12 LAB
CTP QC/QA: YES
POC (AMP) AMPHETAMINE: NEGATIVE
POC (BAR) BARBITURATES: NEGATIVE
POC (BUP) BUPRENORPHINE: NEGATIVE
POC (BZO) BENZODIAZEPINES: NEGATIVE
POC (COC) COCAINE: NEGATIVE
POC (MDMA) METHYLENEDIOXYMETHAMPHETAMINE 3,4: NEGATIVE
POC (MET) METHAMPHETAMINE: NEGATIVE
POC (MOP) OPIATES: NEGATIVE
POC (MTD) METHADONE: NEGATIVE
POC (OXY) OXYCODONE: NEGATIVE
POC (PCP) PHENCYCLIDINE: NEGATIVE
POC (TCA) TRICYCLIC ANTIDEPRESSANTS: NEGATIVE
POC TEMPERATURE (URINE): 90
POC THC: NEGATIVE

## 2023-06-12 PROCEDURE — 80305 POCT URINE DRUG SCREEN PRESUMP: ICD-10-PCS | Mod: QW,,, | Performed by: FAMILY MEDICINE

## 2023-06-12 PROCEDURE — 3074F PR MOST RECENT SYSTOLIC BLOOD PRESSURE < 130 MM HG: ICD-10-PCS | Mod: ,,, | Performed by: FAMILY MEDICINE

## 2023-06-12 PROCEDURE — 1159F PR MEDICATION LIST DOCUMENTED IN MEDICAL RECORD: ICD-10-PCS | Mod: ,,, | Performed by: FAMILY MEDICINE

## 2023-06-12 PROCEDURE — 1160F RVW MEDS BY RX/DR IN RCRD: CPT | Mod: ,,, | Performed by: FAMILY MEDICINE

## 2023-06-12 PROCEDURE — 3008F BODY MASS INDEX DOCD: CPT | Mod: ,,, | Performed by: FAMILY MEDICINE

## 2023-06-12 PROCEDURE — 80305 DRUG TEST PRSMV DIR OPT OBS: CPT | Mod: QW,,, | Performed by: FAMILY MEDICINE

## 2023-06-12 PROCEDURE — 99214 PR OFFICE/OUTPT VISIT, EST, LEVL IV, 30-39 MIN: ICD-10-PCS | Mod: ,,, | Performed by: FAMILY MEDICINE

## 2023-06-12 PROCEDURE — 3079F PR MOST RECENT DIASTOLIC BLOOD PRESSURE 80-89 MM HG: ICD-10-PCS | Mod: ,,, | Performed by: FAMILY MEDICINE

## 2023-06-12 PROCEDURE — 99214 OFFICE O/P EST MOD 30 MIN: CPT | Mod: ,,, | Performed by: FAMILY MEDICINE

## 2023-06-12 PROCEDURE — 3074F SYST BP LT 130 MM HG: CPT | Mod: ,,, | Performed by: FAMILY MEDICINE

## 2023-06-12 PROCEDURE — 3008F PR BODY MASS INDEX (BMI) DOCUMENTED: ICD-10-PCS | Mod: ,,, | Performed by: FAMILY MEDICINE

## 2023-06-12 PROCEDURE — 1160F PR REVIEW ALL MEDS BY PRESCRIBER/CLIN PHARMACIST DOCUMENTED: ICD-10-PCS | Mod: ,,, | Performed by: FAMILY MEDICINE

## 2023-06-12 PROCEDURE — 1159F MED LIST DOCD IN RCRD: CPT | Mod: ,,, | Performed by: FAMILY MEDICINE

## 2023-06-12 PROCEDURE — 3079F DIAST BP 80-89 MM HG: CPT | Mod: ,,, | Performed by: FAMILY MEDICINE

## 2023-06-12 RX ORDER — ZOLPIDEM TARTRATE 5 MG/1
5 TABLET ORAL NIGHTLY
Qty: 30 TABLET | Refills: 2 | Status: SHIPPED | OUTPATIENT
Start: 2023-06-12 | End: 2023-08-30 | Stop reason: SDUPTHER

## 2023-06-12 RX ORDER — ATENOLOL AND CHLORTHALIDONE TABLET 50; 25 MG/1; MG/1
1 TABLET ORAL DAILY
Qty: 90 TABLET | Refills: 1 | Status: SHIPPED | OUTPATIENT
Start: 2023-06-12 | End: 2023-08-30 | Stop reason: SDUPTHER

## 2023-06-12 RX ORDER — FLUOXETINE HYDROCHLORIDE 20 MG/1
CAPSULE ORAL
Qty: 90 CAPSULE | Refills: 1 | Status: SHIPPED | OUTPATIENT
Start: 2023-06-12 | End: 2023-08-30 | Stop reason: SDUPTHER

## 2023-06-12 RX ORDER — ALBUTEROL SULFATE 90 UG/1
AEROSOL, METERED RESPIRATORY (INHALATION)
Qty: 18 G | Refills: 5 | Status: SHIPPED | OUTPATIENT
Start: 2023-06-12 | End: 2023-08-30 | Stop reason: SDUPTHER

## 2023-06-12 NOTE — PROGRESS NOTES
New Clinic Note    Felix OswaldRashad is a 37 y.o. female     CC:   Chief Complaint   Patient presents with    Follow-up     3 month follow up.     Medication Refill        Subjective    History of Present Illness HPI   Patient is for evaluation of chronic medical problems. Patient needs refills. Felix  is tolerating medications well without side effects.       Current Outpatient Medications:     albuterol (PROVENTIL/VENTOLIN HFA) 90 mcg/actuation inhaler, albuterol sulfate HFA 90 mcg/actuation aerosol inhaler  Inhale 1 puff every 4-6 hours by inhalation route as needed., Disp: 18 g, Rfl: 5    atenoloL-chlorthalidone (TENORETIC) 50-25 mg Tab, Take 1 tablet by mouth once daily., Disp: 90 tablet, Rfl: 1    budesonide-formoterol 160-4.5 mcg (SYMBICORT) 160-4.5 mcg/actuation HFAA, Inhale 2 puffs into the lungs 2 (two) times daily., Disp: , Rfl:     cetirizine (ZYRTEC) 10 MG tablet, TAKE 1 TABLET BY MOUTH ONCE DAILY FOR 30 DAYS FOR ITCHING, Disp: , Rfl:     CYANOCOBALAMIN, VITAMIN B-12, INJ, Inject 1 mL as directed every 30 days., Disp: , Rfl:     cyclobenzaprine (FLEXERIL) 10 MG tablet, Take 10 mg by mouth nightly as needed., Disp: , Rfl:     FLUoxetine 20 MG capsule, Take 3 tablets by mouth daily, Disp: 90 capsule, Rfl: 1    ibuprofen (ADVIL,MOTRIN) 800 MG tablet, Take 800 mg by mouth every 6 (six) hours., Disp: , Rfl:     MOUNJARO 5 mg/0.5 mL PnIj, SMARTSI Milligram(s) SUB-Q Once a Week, Disp: , Rfl:     MOUNJARO 7.5 mg/0.5 mL PnIj, SMARTSI.5 Milligram(s) SUB-Q Once a Week, Disp: , Rfl:     potassium chloride SA (K-DUR,KLOR-CON) 20 MEQ tablet, Take 20 mEq by mouth once daily. 1/2 daily, Disp: , Rfl:     zolpidem (AMBIEN) 5 MG Tab, Take 1 tablet (5 mg total) by mouth every evening., Disp: 30 tablet, Rfl: 2     Past Medical History:   Diagnosis Date    Herpes simplex virus (HSV) infection     Hypertension     Mild intermittent asthma without complication 2022        Family History   Problem Relation Age  "of Onset    No Known Problems Father     Diabetes Mother     No Known Problems Son     No Known Problems Son     No Known Problems Daughter         Past Surgical History:   Procedure Laterality Date     SECTION      HYSTERECTOMY      LAPAROSCOPIC SALPINGO-OOPHORECTOMY Left 2021    Procedure: SALPINGO-OOPHORECTOMY, LAPAROSCOPIC;  Surgeon: Maria Tomas MD;  Location: [facility] OR;  Service: OB/GYN;  Laterality: Left;    LAPAROSCOPIC SURGICAL REMOVAL OF ECTOPIC PREGNANCY Right     LAPAROSCOPIC TOTAL HYSTERECTOMY Bilateral 2021    Procedure: HYSTERECTOMY, TOTAL, LAPAROSCOPIC;  Surgeon: Maria Tomas MD;  Location: [facility] OR;  Service: OB/GYN;  Laterality: Bilateral;        Review of Systems   Constitutional:  Negative for fatigue and fever.   HENT:  Negative for ear pain, postnasal drip, rhinorrhea and sinus pressure/congestion.    Respiratory:  Negative for cough and shortness of breath.    Cardiovascular:  Negative for chest pain.   Gastrointestinal:  Negative for abdominal pain, diarrhea, nausea and vomiting.   Genitourinary:  Negative for dysuria.   Neurological:  Negative for headaches.      /84 (BP Location: Right arm, Patient Position: Sitting, BP Method: Large (Automatic))   Pulse 87   Temp 98.3 °F (36.8 °C) (Oral)   Resp 18   Ht 5' 1" (1.549 m)   Wt 82.1 kg (181 lb)   LMP  (LMP Unknown)   SpO2 96%   BMI 34.20 kg/m²      Physical Exam  HENT:      Head: Normocephalic and atraumatic.   Cardiovascular:      Rate and Rhythm: Normal rate and regular rhythm.   Pulmonary:      Effort: Pulmonary effort is normal.      Breath sounds: Normal breath sounds.   Neurological:      Mental Status: She is alert and oriented to person, place, and time.   Psychiatric:         Mood and Affect: Mood normal.         Behavior: Behavior normal.        Assessment and Plan      ICD-10-CM ICD-9-CM   1. Primary hypertension  I10 401.9   2. Mild intermittent asthma without complication "  J45.20 493.90   3. Insomnia, unspecified type  G47.00 780.52   4. Other depression  F32.89 311   5. Long term use of drug  Z79.899 V58.69        1. Primary hypertension  The current medical regimen is effective;  continue present plan and medications.  -     atenoloL-chlorthalidone (TENORETIC) 50-25 mg Tab; Take 1 tablet by mouth once daily.  Dispense: 90 tablet; Refill: 1    2. Mild intermittent asthma without complication  The current medical regimen is effective;  continue present plan and medications.  -     albuterol (PROVENTIL/VENTOLIN HFA) 90 mcg/actuation inhaler; albuterol sulfate HFA 90 mcg/actuation aerosol inhaler   Inhale 1 puff every 4-6 hours by inhalation route as needed.  Dispense: 18 g; Refill: 5    3. Insomnia, unspecified type  The current medical regimen is effective;  continue present plan and medications.  -     zolpidem (AMBIEN) 5 MG Tab; Take 1 tablet (5 mg total) by mouth every evening.  Dispense: 30 tablet; Refill: 2    4. Other depression  The current medical regimen is effective;  continue present plan and medications.  -     FLUoxetine 20 MG capsule; Take 3 tablets by mouth daily  Dispense: 90 capsule; Refill: 1    5. Long term use of drug    and UDS were reviewed and consistent.   -     POCT Urine Drug Screen Presump         Follow up in about 3 months (around 9/12/2023).

## 2023-08-30 ENCOUNTER — OFFICE VISIT (OUTPATIENT)
Dept: FAMILY MEDICINE | Facility: CLINIC | Age: 38
End: 2023-08-30
Payer: MEDICAID

## 2023-08-30 VITALS
SYSTOLIC BLOOD PRESSURE: 121 MMHG | DIASTOLIC BLOOD PRESSURE: 85 MMHG | HEART RATE: 71 BPM | HEIGHT: 61 IN | OXYGEN SATURATION: 97 % | TEMPERATURE: 98 F | BODY MASS INDEX: 35.5 KG/M2 | WEIGHT: 188 LBS

## 2023-08-30 DIAGNOSIS — Z79.899 LONG TERM USE OF DRUG: ICD-10-CM

## 2023-08-30 DIAGNOSIS — J45.20 MILD INTERMITTENT ASTHMA WITHOUT COMPLICATION: ICD-10-CM

## 2023-08-30 DIAGNOSIS — I10 PRIMARY HYPERTENSION: ICD-10-CM

## 2023-08-30 DIAGNOSIS — N62 LARGE BREASTS: Primary | ICD-10-CM

## 2023-08-30 DIAGNOSIS — F32.89 OTHER DEPRESSION: ICD-10-CM

## 2023-08-30 DIAGNOSIS — G47.00 INSOMNIA, UNSPECIFIED TYPE: ICD-10-CM

## 2023-08-30 DIAGNOSIS — R53.83 FATIGUE, UNSPECIFIED TYPE: ICD-10-CM

## 2023-08-30 LAB
BASOPHILS # BLD AUTO: 0.03 K/UL (ref 0–0.2)
BASOPHILS NFR BLD AUTO: 0.3 % (ref 0–1)
CTP QC/QA: YES
DIFFERENTIAL METHOD BLD: ABNORMAL
EOSINOPHIL # BLD AUTO: 0.16 K/UL (ref 0–0.5)
EOSINOPHIL NFR BLD AUTO: 1.6 % (ref 1–4)
ERYTHROCYTE [DISTWIDTH] IN BLOOD BY AUTOMATED COUNT: 13.9 % (ref 11.5–14.5)
HCT VFR BLD AUTO: 36.1 % (ref 38–47)
HGB BLD-MCNC: 12.3 G/DL (ref 12–16)
IMM GRANULOCYTES # BLD AUTO: 0.06 K/UL (ref 0–0.04)
IMM GRANULOCYTES NFR BLD: 0.6 % (ref 0–0.4)
LYMPHOCYTES # BLD AUTO: 3.38 K/UL (ref 1–4.8)
LYMPHOCYTES NFR BLD AUTO: 32.8 % (ref 27–41)
MCH RBC QN AUTO: 28.9 PG (ref 27–31)
MCHC RBC AUTO-ENTMCNC: 34.1 G/DL (ref 32–36)
MCV RBC AUTO: 84.9 FL (ref 80–96)
MONOCYTES # BLD AUTO: 0.77 K/UL (ref 0–0.8)
MONOCYTES NFR BLD AUTO: 7.5 % (ref 2–6)
MPC BLD CALC-MCNC: 9 FL (ref 9.4–12.4)
NEUTROPHILS # BLD AUTO: 5.91 K/UL (ref 1.8–7.7)
NEUTROPHILS NFR BLD AUTO: 57.2 % (ref 53–65)
NRBC # BLD AUTO: 0 X10E3/UL
NRBC, AUTO (.00): 0 %
PLATELET # BLD AUTO: 421 K/UL (ref 150–400)
POC (AMP) AMPHETAMINE: NEGATIVE
POC (BAR) BARBITURATES: NEGATIVE
POC (BUP) BUPRENORPHINE: NEGATIVE
POC (BZO) BENZODIAZEPINES: NEGATIVE
POC (COC) COCAINE: NEGATIVE
POC (MDMA) METHYLENEDIOXYMETHAMPHETAMINE 3,4: NEGATIVE
POC (MET) METHAMPHETAMINE: NEGATIVE
POC (MOP) OPIATES: NEGATIVE
POC (MTD) METHADONE: NEGATIVE
POC (OXY) OXYCODONE: NEGATIVE
POC (PCP) PHENCYCLIDINE: NEGATIVE
POC (TCA) TRICYCLIC ANTIDEPRESSANTS: NEGATIVE
POC TEMPERATURE (URINE): 92
POC THC: NEGATIVE
RBC # BLD AUTO: 4.25 M/UL (ref 4.2–5.4)
TSH SERPL DL<=0.005 MIU/L-ACNC: 2.26 UIU/ML (ref 0.36–3.74)
WBC # BLD AUTO: 10.31 K/UL (ref 4.5–11)

## 2023-08-30 PROCEDURE — 3079F DIAST BP 80-89 MM HG: CPT | Mod: CPTII,,, | Performed by: FAMILY MEDICINE

## 2023-08-30 PROCEDURE — 80305 DRUG TEST PRSMV DIR OPT OBS: CPT | Mod: RHCUB | Performed by: FAMILY MEDICINE

## 2023-08-30 PROCEDURE — 99214 OFFICE O/P EST MOD 30 MIN: CPT | Mod: ,,, | Performed by: FAMILY MEDICINE

## 2023-08-30 PROCEDURE — 3074F PR MOST RECENT SYSTOLIC BLOOD PRESSURE < 130 MM HG: ICD-10-PCS | Mod: CPTII,,, | Performed by: FAMILY MEDICINE

## 2023-08-30 PROCEDURE — 1159F MED LIST DOCD IN RCRD: CPT | Mod: CPTII,,, | Performed by: FAMILY MEDICINE

## 2023-08-30 PROCEDURE — 84443 TSH: ICD-10-PCS | Mod: ,,, | Performed by: CLINICAL MEDICAL LABORATORY

## 2023-08-30 PROCEDURE — 1159F PR MEDICATION LIST DOCUMENTED IN MEDICAL RECORD: ICD-10-PCS | Mod: CPTII,,, | Performed by: FAMILY MEDICINE

## 2023-08-30 PROCEDURE — 85025 CBC WITH DIFFERENTIAL: ICD-10-PCS | Mod: ,,, | Performed by: CLINICAL MEDICAL LABORATORY

## 2023-08-30 PROCEDURE — 3074F SYST BP LT 130 MM HG: CPT | Mod: CPTII,,, | Performed by: FAMILY MEDICINE

## 2023-08-30 PROCEDURE — 3008F BODY MASS INDEX DOCD: CPT | Mod: CPTII,,, | Performed by: FAMILY MEDICINE

## 2023-08-30 PROCEDURE — 99214 PR OFFICE/OUTPT VISIT, EST, LEVL IV, 30-39 MIN: ICD-10-PCS | Mod: ,,, | Performed by: FAMILY MEDICINE

## 2023-08-30 PROCEDURE — 85025 COMPLETE CBC W/AUTO DIFF WBC: CPT | Mod: ,,, | Performed by: CLINICAL MEDICAL LABORATORY

## 2023-08-30 PROCEDURE — 84443 ASSAY THYROID STIM HORMONE: CPT | Mod: ,,, | Performed by: CLINICAL MEDICAL LABORATORY

## 2023-08-30 PROCEDURE — 3008F PR BODY MASS INDEX (BMI) DOCUMENTED: ICD-10-PCS | Mod: CPTII,,, | Performed by: FAMILY MEDICINE

## 2023-08-30 PROCEDURE — 1160F PR REVIEW ALL MEDS BY PRESCRIBER/CLIN PHARMACIST DOCUMENTED: ICD-10-PCS | Mod: CPTII,,, | Performed by: FAMILY MEDICINE

## 2023-08-30 PROCEDURE — 3079F PR MOST RECENT DIASTOLIC BLOOD PRESSURE 80-89 MM HG: ICD-10-PCS | Mod: CPTII,,, | Performed by: FAMILY MEDICINE

## 2023-08-30 PROCEDURE — 1160F RVW MEDS BY RX/DR IN RCRD: CPT | Mod: CPTII,,, | Performed by: FAMILY MEDICINE

## 2023-08-30 RX ORDER — FLUOXETINE HYDROCHLORIDE 20 MG/1
CAPSULE ORAL
Qty: 90 CAPSULE | Refills: 1 | Status: SHIPPED | OUTPATIENT
Start: 2023-08-30 | End: 2024-02-21 | Stop reason: SDUPTHER

## 2023-08-30 RX ORDER — ZOLPIDEM TARTRATE 5 MG/1
5 TABLET ORAL NIGHTLY
Qty: 30 TABLET | Refills: 2 | Status: SHIPPED | OUTPATIENT
Start: 2023-08-30 | End: 2023-11-30 | Stop reason: SDUPTHER

## 2023-08-30 RX ORDER — ATENOLOL AND CHLORTHALIDONE TABLET 50; 25 MG/1; MG/1
1 TABLET ORAL DAILY
Qty: 90 TABLET | Refills: 1 | Status: SHIPPED | OUTPATIENT
Start: 2023-08-30 | End: 2024-02-21 | Stop reason: SDUPTHER

## 2023-08-30 RX ORDER — ALBUTEROL SULFATE 90 UG/1
AEROSOL, METERED RESPIRATORY (INHALATION)
Qty: 18 G | Refills: 5 | Status: SHIPPED | OUTPATIENT
Start: 2023-08-30

## 2023-08-30 NOTE — PROGRESS NOTES
New Clinic Note    Felix OswaldRashad is a 37 y.o. female     CC:   Chief Complaint   Patient presents with    Referral     Patient want a referral for breast reduction.     Labs Only     Want her iron level checked. Energy level low and hair coming out. Face feel weird also . Read about something in heard it was post COVID system and want to find out what's going on.        Subjective    History of Present Illness HPI   Patient is for evaluation of chronic medical problems. Patient needs refills. Felix  is tolerating medications well without side effects.   Patient complains of bilateral shoulder pain. She is concerned that her large breasts could be causing this. Her bra digs into her shoulders. She is a 34 DDD.     Current Outpatient Medications:     albuterol (PROVENTIL/VENTOLIN HFA) 90 mcg/actuation inhaler, albuterol sulfate HFA 90 mcg/actuation aerosol inhaler  Inhale 1 puff every 4-6 hours by inhalation route as needed., Disp: 18 g, Rfl: 5    atenoloL-chlorthalidone (TENORETIC) 50-25 mg Tab, Take 1 tablet by mouth once daily., Disp: 90 tablet, Rfl: 1    budesonide-formoterol 160-4.5 mcg (SYMBICORT) 160-4.5 mcg/actuation HFAA, Inhale 2 puffs into the lungs 2 (two) times daily., Disp: , Rfl:     cetirizine (ZYRTEC) 10 MG tablet, TAKE 1 TABLET BY MOUTH ONCE DAILY FOR 30 DAYS FOR ITCHING, Disp: , Rfl:     CYANOCOBALAMIN, VITAMIN B-12, INJ, Inject 1 mL as directed every 30 days., Disp: , Rfl:     cyclobenzaprine (FLEXERIL) 10 MG tablet, Take 10 mg by mouth nightly as needed., Disp: , Rfl:     FLUoxetine 20 MG capsule, Take 3 tablets by mouth daily, Disp: 90 capsule, Rfl: 1    ibuprofen (ADVIL,MOTRIN) 800 MG tablet, Take 800 mg by mouth every 6 (six) hours., Disp: , Rfl:     MOUNJARO 5 mg/0.5 mL PnIj, SMARTSI Milligram(s) SUB-Q Once a Week, Disp: , Rfl:     MOUNJARO 7.5 mg/0.5 mL PnIj, SMARTSI.5 Milligram(s) SUB-Q Once a Week, Disp: , Rfl:     potassium chloride SA (K-DUR,KLOR-CON) 20 MEQ tablet, Take  "20 mEq by mouth once daily. 1/2 daily, Disp: , Rfl:     zolpidem (AMBIEN) 5 MG Tab, Take 1 tablet (5 mg total) by mouth every evening., Disp: 30 tablet, Rfl: 2     Past Medical History:   Diagnosis Date    Herpes simplex virus (HSV) infection     Hypertension     Mild intermittent asthma without complication 2022        Family History   Problem Relation Age of Onset    No Known Problems Father     Diabetes Mother     No Known Problems Son     No Known Problems Son     No Known Problems Daughter         Past Surgical History:   Procedure Laterality Date     SECTION      HYSTERECTOMY      LAPAROSCOPIC SALPINGO-OOPHORECTOMY Left 2021    Procedure: SALPINGO-OOPHORECTOMY, LAPAROSCOPIC;  Surgeon: Maria Tomas MD;  Location: [facility];  Service: OB/GYN;  Laterality: Left;    LAPAROSCOPIC SURGICAL REMOVAL OF ECTOPIC PREGNANCY Right     LAPAROSCOPIC TOTAL HYSTERECTOMY Bilateral 2021    Procedure: HYSTERECTOMY, TOTAL, LAPAROSCOPIC;  Surgeon: Maria Tomas MD;  Location: [facility];  Service: OB/GYN;  Laterality: Bilateral;        Review of Systems   Constitutional:  Negative for fatigue and fever.   HENT:  Negative for ear pain, postnasal drip, rhinorrhea and sinus pressure/congestion.    Respiratory:  Negative for cough and shortness of breath.    Cardiovascular:  Negative for chest pain.   Gastrointestinal:  Negative for abdominal pain, diarrhea, nausea and vomiting.   Genitourinary:  Negative for dysuria.   Neurological:  Negative for headaches.        /85 (BP Location: Right arm, Patient Position: Sitting)   Pulse 71   Temp 98.1 °F (36.7 °C) (Oral)   Ht 5' 1" (1.549 m)   Wt 85.3 kg (188 lb)   LMP  (LMP Unknown)   SpO2 97%   BMI 35.52 kg/m²      Physical Exam  HENT:      Head: Normocephalic and atraumatic.   Cardiovascular:      Rate and Rhythm: Normal rate and regular rhythm.   Pulmonary:      Effort: Pulmonary effort is normal.      Breath sounds: Normal " breath sounds.   Chest:      Comments: Large breasts. Indentations noted in bilateral shoulders due to bra.   Neurological:      Mental Status: She is alert and oriented to person, place, and time.   Psychiatric:         Mood and Affect: Mood normal.         Behavior: Behavior normal.          Assessment and Plan      ICD-10-CM ICD-9-CM   1. Large breasts  N62 611.1   2. Long term use of drug  Z79.899 V58.69   3. Mild intermittent asthma without complication  J45.20 493.90   4. Primary hypertension  I10 401.9   5. Other depression  F32.89 311   6. Insomnia, unspecified type  G47.00 780.52   7. Fatigue, unspecified type  R53.83 780.79        1. Large breasts  Will refer to plastic surgery for an evaluation  -     Ambulatory referral/consult to Plastic Surgery; Future; Expected date: 09/06/2023    2. Long term use of drug    and UDS were reviewed and consistent.   -     POCT Urine Drug Screen Presump    3. Mild intermittent asthma without complication  The current medical regimen is effective;  continue present plan and medications.  -     albuterol (PROVENTIL/VENTOLIN HFA) 90 mcg/actuation inhaler; albuterol sulfate HFA 90 mcg/actuation aerosol inhaler   Inhale 1 puff every 4-6 hours by inhalation route as needed.  Dispense: 18 g; Refill: 5    4. Primary hypertension  The current medical regimen is effective;  continue present plan and medications.  -     atenoloL-chlorthalidone (TENORETIC) 50-25 mg Tab; Take 1 tablet by mouth once daily.  Dispense: 90 tablet; Refill: 1    5. Other depression  The current medical regimen is effective;  continue present plan and medications.  -     FLUoxetine 20 MG capsule; Take 3 tablets by mouth daily  Dispense: 90 capsule; Refill: 1    6. Insomnia, unspecified type  The current medical regimen is effective;  continue present plan and medications.  -     zolpidem (AMBIEN) 5 MG Tab; Take 1 tablet (5 mg total) by mouth every evening.  Dispense: 30 tablet; Refill: 2    7. Fatigue,  unspecified type  -     CBC Auto Differential; Future; Expected date: 08/30/2023  -     TSH; Future; Expected date: 08/30/2023         Follow up in about 3 months (around 11/30/2023).

## 2023-10-30 ENCOUNTER — OFFICE VISIT (OUTPATIENT)
Dept: OBSTETRICS AND GYNECOLOGY | Facility: CLINIC | Age: 38
End: 2023-10-30
Payer: MEDICAID

## 2023-10-30 VITALS
DIASTOLIC BLOOD PRESSURE: 84 MMHG | BODY MASS INDEX: 37.41 KG/M2 | WEIGHT: 198 LBS | HEART RATE: 79 BPM | SYSTOLIC BLOOD PRESSURE: 110 MMHG

## 2023-10-30 DIAGNOSIS — Z12.31 SCREENING MAMMOGRAM FOR BREAST CANCER: ICD-10-CM

## 2023-10-30 DIAGNOSIS — Z01.419 ROUTINE GYNECOLOGICAL EXAMINATION: Primary | ICD-10-CM

## 2023-10-30 DIAGNOSIS — Z11.4 ENCOUNTER FOR SCREENING FOR HIV: ICD-10-CM

## 2023-10-30 DIAGNOSIS — Z12.72 SPECIAL SCREENING FOR MALIGNANT NEOPLASMS, VAGINA: ICD-10-CM

## 2023-10-30 DIAGNOSIS — R23.2 HOT FLASHES: ICD-10-CM

## 2023-10-30 DIAGNOSIS — Z72.51 HIGH RISK SEXUAL BEHAVIOR, UNSPECIFIED TYPE: ICD-10-CM

## 2023-10-30 DIAGNOSIS — Z11.3 SCREEN FOR STD (SEXUALLY TRANSMITTED DISEASE): ICD-10-CM

## 2023-10-30 LAB
BACTERIA VAG QL WET PREP: ABNORMAL /HPF
CLUE CELLS VAG QL WET PREP: ABNORMAL /HPF
HAV IGM SER QL: NORMAL
HBV CORE IGM SER QL: NORMAL
HBV SURFACE AG SERPL QL IA: NORMAL
HCV AB SER QL: NORMAL
HIV 1+O+2 AB SERPL QL: NORMAL
RBC #/AREA VAG WET PREP: ABNORMAL /HPF
SQUAMOUS EPITHELIALS WET WOUNT, GENITAL: ABNORMAL /HPF
SYPHILIS AB INTERPRETATION: NORMAL
T VAGINALIS VAG QL WET PREP: ABNORMAL /HPF
WBC CLUMPS WET MOUNT, GENITAL: ABNORMAL /HPF
WBC VAG QL WET PREP: ABNORMAL /HPF
YEAST VAG QL WET PREP: ABNORMAL /HPF

## 2023-10-30 PROCEDURE — 88142 CYTOPATH C/V THIN LAYER: CPT | Mod: TC,GCY | Performed by: OBSTETRICS & GYNECOLOGY

## 2023-10-30 PROCEDURE — 87624 HPV HI-RISK TYP POOLED RSLT: CPT | Mod: ,,, | Performed by: CLINICAL MEDICAL LABORATORY

## 2023-10-30 PROCEDURE — 99395 PR PREVENTIVE VISIT,EST,18-39: ICD-10-PCS | Mod: ,,, | Performed by: OBSTETRICS & GYNECOLOGY

## 2023-10-30 PROCEDURE — 87591 N.GONORRHOEAE DNA AMP PROB: CPT | Mod: ,,, | Performed by: CLINICAL MEDICAL LABORATORY

## 2023-10-30 PROCEDURE — 80074 HEPATITIS PANEL, ACUTE: ICD-10-PCS | Mod: ,,, | Performed by: CLINICAL MEDICAL LABORATORY

## 2023-10-30 PROCEDURE — 36415 COLL VENOUS BLD VENIPUNCTURE: CPT | Mod: ,,, | Performed by: OBSTETRICS & GYNECOLOGY

## 2023-10-30 PROCEDURE — 1159F MED LIST DOCD IN RCRD: CPT | Mod: CPTII,,, | Performed by: OBSTETRICS & GYNECOLOGY

## 2023-10-30 PROCEDURE — 87210 WET PREP, GENITAL: ICD-10-PCS | Mod: ,,, | Performed by: CLINICAL MEDICAL LABORATORY

## 2023-10-30 PROCEDURE — 87491 CHLMYD TRACH DNA AMP PROBE: CPT | Mod: ,,, | Performed by: CLINICAL MEDICAL LABORATORY

## 2023-10-30 PROCEDURE — 80074 ACUTE HEPATITIS PANEL: CPT | Mod: ,,, | Performed by: CLINICAL MEDICAL LABORATORY

## 2023-10-30 PROCEDURE — 87624 HUMAN PAPILLOMAVIRUS (HPV): ICD-10-PCS | Mod: ,,, | Performed by: CLINICAL MEDICAL LABORATORY

## 2023-10-30 PROCEDURE — 3074F PR MOST RECENT SYSTOLIC BLOOD PRESSURE < 130 MM HG: ICD-10-PCS | Mod: CPTII,,, | Performed by: OBSTETRICS & GYNECOLOGY

## 2023-10-30 PROCEDURE — 87210 SMEAR WET MOUNT SALINE/INK: CPT | Mod: ,,, | Performed by: CLINICAL MEDICAL LABORATORY

## 2023-10-30 PROCEDURE — 3008F PR BODY MASS INDEX (BMI) DOCUMENTED: ICD-10-PCS | Mod: CPTII,,, | Performed by: OBSTETRICS & GYNECOLOGY

## 2023-10-30 PROCEDURE — 3079F PR MOST RECENT DIASTOLIC BLOOD PRESSURE 80-89 MM HG: ICD-10-PCS | Mod: CPTII,,, | Performed by: OBSTETRICS & GYNECOLOGY

## 2023-10-30 PROCEDURE — 86780 TREPONEMA PALLIDUM: CPT | Mod: ,,, | Performed by: CLINICAL MEDICAL LABORATORY

## 2023-10-30 PROCEDURE — 1159F PR MEDICATION LIST DOCUMENTED IN MEDICAL RECORD: ICD-10-PCS | Mod: CPTII,,, | Performed by: OBSTETRICS & GYNECOLOGY

## 2023-10-30 PROCEDURE — 36415 PR COLLECTION VENOUS BLOOD,VENIPUNCTURE: ICD-10-PCS | Mod: ,,, | Performed by: OBSTETRICS & GYNECOLOGY

## 2023-10-30 PROCEDURE — 86780 TREPONEMA PALLIDUM (SYPHILIS) ANTIBODY: ICD-10-PCS | Mod: ,,, | Performed by: CLINICAL MEDICAL LABORATORY

## 2023-10-30 PROCEDURE — 87389 HIV 1 / 2 ANTIBODY: ICD-10-PCS | Mod: ,,, | Performed by: CLINICAL MEDICAL LABORATORY

## 2023-10-30 PROCEDURE — 99395 PREV VISIT EST AGE 18-39: CPT | Mod: ,,, | Performed by: OBSTETRICS & GYNECOLOGY

## 2023-10-30 PROCEDURE — 87491 CHLAMYDIA/GONORRHOEAE(GC), PCR: ICD-10-PCS | Mod: ,,, | Performed by: CLINICAL MEDICAL LABORATORY

## 2023-10-30 PROCEDURE — 87591 CHLAMYDIA/GONORRHOEAE(GC), PCR: ICD-10-PCS | Mod: ,,, | Performed by: CLINICAL MEDICAL LABORATORY

## 2023-10-30 PROCEDURE — 3008F BODY MASS INDEX DOCD: CPT | Mod: CPTII,,, | Performed by: OBSTETRICS & GYNECOLOGY

## 2023-10-30 PROCEDURE — 3074F SYST BP LT 130 MM HG: CPT | Mod: CPTII,,, | Performed by: OBSTETRICS & GYNECOLOGY

## 2023-10-30 PROCEDURE — 3079F DIAST BP 80-89 MM HG: CPT | Mod: CPTII,,, | Performed by: OBSTETRICS & GYNECOLOGY

## 2023-10-30 PROCEDURE — 87389 HIV-1 AG W/HIV-1&-2 AB AG IA: CPT | Mod: ,,, | Performed by: CLINICAL MEDICAL LABORATORY

## 2023-10-30 RX ORDER — ESTRADIOL 0.5 MG/1
0.5 TABLET ORAL DAILY
Qty: 30 TABLET | Refills: 11 | Status: SHIPPED | OUTPATIENT
Start: 2023-10-30 | End: 2024-10-29

## 2023-10-30 NOTE — PROGRESS NOTES
CC: Well woman exam    Felix Smyth is a 37 y.o. female  presents for well woman exam.    LMP: No LMP recorded (lmp unknown). Patient has had a hysterectomy..    Last mammogram: N/A  Last Colonoscopy: N/A    Pt complains of hot flashes.    Past Medical History:   Diagnosis Date    Herpes simplex virus (HSV) infection     Hypertension     Mild intermittent asthma without complication 2022     Past Surgical History:   Procedure Laterality Date     SECTION      HYSTERECTOMY      LAPAROSCOPIC SALPINGO-OOPHORECTOMY Left 2021    Procedure: SALPINGO-OOPHORECTOMY, LAPAROSCOPIC;  Surgeon: Maria Tomas MD;  Location: [facility], [place];  Service: OB/GYN;  Laterality: Left;    LAPAROSCOPIC SURGICAL REMOVAL OF ECTOPIC PREGNANCY Right     LAPAROSCOPIC TOTAL HYSTERECTOMY Bilateral 2021    Procedure: HYSTERECTOMY, TOTAL, LAPAROSCOPIC;  Surgeon: Maria Tomas MD;  Location: [facility], [place];  Service: OB/GYN;  Laterality: Bilateral;     Social History     Socioeconomic History    Marital status: Legally    Tobacco Use    Smoking status: Never     Passive exposure: Never    Smokeless tobacco: Never   Substance and Sexual Activity    Alcohol use: Yes     Comment: Socially    Drug use: Never    Sexual activity: Yes     Partners: Male     Birth control/protection: None     Family History   Problem Relation Age of Onset    No Known Problems Father     Diabetes Mother     No Known Problems Son     No Known Problems Son     No Known Problems Daughter      OB History          7    Para   4    Term   2       2    AB   3    Living   3         SAB   2    IAB        Ectopic   1    Multiple        Live Births   3                 /84   Pulse 79   Wt 89.8 kg (198 lb)   LMP  (LMP Unknown)   BMI 37.41 kg/m²       ROS:  GENERAL: Denies weight gain or weight loss. Feeling well overall.   SKIN: Denies rash or lesions.   HEAD: Denies head injury or headache.   NODES:  Denies enlarged lymph nodes.   CHEST: Denies chest pain or shortness of breath.   CARDIOVASCULAR: Denies palpitations or left sided chest pain.   ABDOMEN: No abdominal pain, constipation, diarrhea, nausea, vomiting or rectal bleeding.   URINARY: No frequency, dysuria, hematuria, or burning on urination.  REPRODUCTIVE: See HPI.   BREASTS: The patient performs breast self-examination and denies pain, lumps, or nipple discharge.   HEMATOLOGIC: No easy bruisability or excessive bleeding.   MUSCULOSKELETAL: Denies joint pain or swelling.   NEUROLOGIC: Denies syncope or weakness.   PSYCHIATRIC: Denies depression, anxiety or mood swings.    PHYSICAL EXAM:  APPEARANCE: Well nourished, well developed, in no acute distress.  AFFECT: WNL, alert and oriented x 3  SKIN: No acne or hirsutism  NECK: Neck symmetric without masses or thyromegaly  NODES: No inguinal, cervical, axillary, or femoral lymph node enlargement  CHEST: Good respiratory effect  ABDOMEN: Soft.  No tenderness or masses.  No hepatosplenomegaly.  No hernias.  BREASTS: Symmetrical, no skin changes or visible lesions.  No palpable masses, nipple discharge bilaterally.  PELVIC: Normal external genitalia without lesions.  Normal hair distribution.  Adequate perineal body, normal urethral meatus.  Vagina moist and well rugated without lesions or discharge.  Cervix  and uterus surgically absent. Adnexa without masses or tenderness.    EXTREMITIES: No edema.    Routine gynecological examination  -     ThinPrep Pap Test; Future; Expected date: 10/30/2023    Special screening for malignant neoplasms, vagina  -     ThinPrep Pap Test; Future; Expected date: 10/30/2023    Screen for STD (sexually transmitted disease)  -     Chlamydia/GC, PCR; Future; Expected date: 10/30/2023  -     Treponema Pallidum (Syphillis) Antibody; Future; Expected date: 10/30/2023  -     HIV 1/2 Ag/Ab (4th Gen); Future; Expected date: 10/30/2023  -     Hepatitis Panel, Acute; Future; Expected date:  10/30/2023  -     Wet Prep, Genital    High risk sexual behavior, unspecified type  -     Chlamydia/GC, PCR; Future; Expected date: 10/30/2023  -     Treponema Pallidum (Syphillis) Antibody; Future; Expected date: 10/30/2023  -     HIV 1/2 Ag/Ab (4th Gen); Future; Expected date: 10/30/2023  -     Hepatitis Panel, Acute; Future; Expected date: 10/30/2023  -     Wet Prep, Genital    Encounter for screening for HIV  -     HIV 1/2 Ag/Ab (4th Gen); Future; Expected date: 10/30/2023    Hot flashes  -     estradioL (ESTRACE) 0.5 MG tablet; Take 1 tablet (0.5 mg total) by mouth once daily.  Dispense: 30 tablet; Refill: 11    Screening mammogram for breast cancer  -     Mammo Digital Screening Bilat; Future; Expected date: 10/30/2023            Patient was counseled today on A.C.S. Pap guidelines and recommendations for yearly pelvic exams, mammograms and monthly self breast exams; to see her PCP for other health maintenance.   Exercise regimen encouraged  Healthy food choices encouraged  Questions answered to desired level of satisfaction  Verbalized understanding to all information and instructions    Follow up in about 1 year (around 10/30/2024) for Annual.      Maria Tomas M.D., FCOG    OB/GYN

## 2023-10-31 LAB
CHLAMYDIA BY PCR: NEGATIVE
N. GONORRHOEAE (GC) BY PCR: NEGATIVE

## 2023-11-01 LAB
GH SERPL-MCNC: NORMAL NG/ML
INSULIN SERPL-ACNC: NORMAL U[IU]/ML
LAB AP CLINICAL INFORMATION: NORMAL
LAB AP GYN INTERPRETATION: NEGATIVE
LAB AP PAP DISCLAIMER COMMENTS: NORMAL
RENIN PLAS-CCNC: NORMAL NG/ML/H

## 2023-11-04 LAB
HPV 16: NEGATIVE
HPV 18: NEGATIVE
HPV OTHER: NEGATIVE

## 2023-11-30 ENCOUNTER — OFFICE VISIT (OUTPATIENT)
Dept: FAMILY MEDICINE | Facility: CLINIC | Age: 38
End: 2023-11-30
Payer: MEDICAID

## 2023-11-30 VITALS
OXYGEN SATURATION: 98 % | DIASTOLIC BLOOD PRESSURE: 84 MMHG | RESPIRATION RATE: 18 BRPM | HEART RATE: 77 BPM | SYSTOLIC BLOOD PRESSURE: 120 MMHG | TEMPERATURE: 99 F | BODY MASS INDEX: 37 KG/M2 | HEIGHT: 61 IN | WEIGHT: 196 LBS

## 2023-11-30 DIAGNOSIS — Z79.899 LONG TERM USE OF DRUG: ICD-10-CM

## 2023-11-30 DIAGNOSIS — G47.00 INSOMNIA, UNSPECIFIED TYPE: Primary | ICD-10-CM

## 2023-11-30 DIAGNOSIS — G56.01 CARPAL TUNNEL SYNDROME OF RIGHT WRIST: ICD-10-CM

## 2023-11-30 LAB
CTP QC/QA: YES
POC (AMP) AMPHETAMINE: NEGATIVE
POC (BAR) BARBITURATES: NEGATIVE
POC (BUP) BUPRENORPHINE: NEGATIVE
POC (BZO) BENZODIAZEPINES: ABNORMAL
POC (COC) COCAINE: NEGATIVE
POC (MDMA) METHYLENEDIOXYMETHAMPHETAMINE 3,4: NEGATIVE
POC (MET) METHAMPHETAMINE: NEGATIVE
POC (MOP) OPIATES: NEGATIVE
POC (MTD) METHADONE: NEGATIVE
POC (OXY) OXYCODONE: NEGATIVE
POC (PCP) PHENCYCLIDINE: NEGATIVE
POC (TCA) TRICYCLIC ANTIDEPRESSANTS: NEGATIVE
POC TEMPERATURE (URINE): 94
POC THC: NEGATIVE

## 2023-11-30 PROCEDURE — 3008F BODY MASS INDEX DOCD: CPT | Mod: CPTII,,, | Performed by: FAMILY MEDICINE

## 2023-11-30 PROCEDURE — 1160F PR REVIEW ALL MEDS BY PRESCRIBER/CLIN PHARMACIST DOCUMENTED: ICD-10-PCS | Mod: CPTII,,, | Performed by: FAMILY MEDICINE

## 2023-11-30 PROCEDURE — 3008F PR BODY MASS INDEX (BMI) DOCUMENTED: ICD-10-PCS | Mod: CPTII,,, | Performed by: FAMILY MEDICINE

## 2023-11-30 PROCEDURE — 1159F MED LIST DOCD IN RCRD: CPT | Mod: CPTII,,, | Performed by: FAMILY MEDICINE

## 2023-11-30 PROCEDURE — G0482 DRUG TEST DEF 15-21 CLASSES: HCPCS | Mod: 90,,, | Performed by: CLINICAL MEDICAL LABORATORY

## 2023-11-30 PROCEDURE — 80305 DRUG TEST PRSMV DIR OPT OBS: CPT | Mod: RHCUB | Performed by: FAMILY MEDICINE

## 2023-11-30 PROCEDURE — G0482 MAYO GENERIC ORDERABLE: ICD-10-PCS | Mod: 90,,, | Performed by: CLINICAL MEDICAL LABORATORY

## 2023-11-30 PROCEDURE — 99214 OFFICE O/P EST MOD 30 MIN: CPT | Mod: ,,, | Performed by: FAMILY MEDICINE

## 2023-11-30 PROCEDURE — 3074F PR MOST RECENT SYSTOLIC BLOOD PRESSURE < 130 MM HG: ICD-10-PCS | Mod: CPTII,,, | Performed by: FAMILY MEDICINE

## 2023-11-30 PROCEDURE — 99214 PR OFFICE/OUTPT VISIT, EST, LEVL IV, 30-39 MIN: ICD-10-PCS | Mod: ,,, | Performed by: FAMILY MEDICINE

## 2023-11-30 PROCEDURE — 3079F PR MOST RECENT DIASTOLIC BLOOD PRESSURE 80-89 MM HG: ICD-10-PCS | Mod: CPTII,,, | Performed by: FAMILY MEDICINE

## 2023-11-30 PROCEDURE — 3074F SYST BP LT 130 MM HG: CPT | Mod: CPTII,,, | Performed by: FAMILY MEDICINE

## 2023-11-30 PROCEDURE — 3079F DIAST BP 80-89 MM HG: CPT | Mod: CPTII,,, | Performed by: FAMILY MEDICINE

## 2023-11-30 PROCEDURE — 1160F RVW MEDS BY RX/DR IN RCRD: CPT | Mod: CPTII,,, | Performed by: FAMILY MEDICINE

## 2023-11-30 PROCEDURE — 1159F PR MEDICATION LIST DOCUMENTED IN MEDICAL RECORD: ICD-10-PCS | Mod: CPTII,,, | Performed by: FAMILY MEDICINE

## 2023-11-30 RX ORDER — ZOLPIDEM TARTRATE 5 MG/1
5 TABLET ORAL NIGHTLY
Qty: 30 TABLET | Refills: 0 | Status: SHIPPED | OUTPATIENT
Start: 2023-11-30 | End: 2023-12-22 | Stop reason: SDUPTHER

## 2023-11-30 NOTE — PROGRESS NOTES
New Clinic Note    Felix Smyth is a 38 y.o. female     CC:   Chief Complaint   Patient presents with    Insomnia     Patient stated she is here for her 3 month follow up on insomnia and to get renewal on her Ambien sent to [facility].     Follow-up    Medication Refill     Wants her medication sent to [facility] in [place].     Hand Pain     Complains of numbness and tingling of right hand (dominant hand) and stated she uses her hands constantly.        Subjective    History of Present Illness HPI   Patient is for evaluation of chronic medical problems. Patient needs refills. Felix  is tolerating medications well without side effects.     Patient complains of right hand and finger tingling/numbness for years. Patient states the numbness/ and tingling are gradually getting worse. She has tried a brace with no relief.     Current Outpatient Medications:     albuterol (PROVENTIL/VENTOLIN HFA) 90 mcg/actuation inhaler, albuterol sulfate HFA 90 mcg/actuation aerosol inhaler  Inhale 1 puff every 4-6 hours by inhalation route as needed., Disp: 18 g, Rfl: 5    atenoloL-chlorthalidone (TENORETIC) 50-25 mg Tab, Take 1 tablet by mouth once daily., Disp: 90 tablet, Rfl: 1    budesonide-formoterol 160-4.5 mcg (SYMBICORT) 160-4.5 mcg/actuation HFAA, Inhale 2 puffs into the lungs 2 (two) times daily., Disp: , Rfl:     CYANOCOBALAMIN, VITAMIN B-12, INJ, Inject 1 mL as directed every 30 days., Disp: , Rfl:     cyclobenzaprine (FLEXERIL) 10 MG tablet, Take 10 mg by mouth nightly as needed., Disp: , Rfl:     estradioL (ESTRACE) 0.5 MG tablet, Take 1 tablet (0.5 mg total) by mouth once daily., Disp: 30 tablet, Rfl: 11    FLUoxetine 20 MG capsule, Take 3 tablets by mouth daily, Disp: 90 capsule, Rfl: 1    ibuprofen (ADVIL,MOTRIN) 800 MG tablet, Take 800 mg by mouth every 6 (six) hours., Disp: , Rfl:     cetirizine (ZYRTEC) 10 MG tablet, TAKE 1 TABLET BY MOUTH ONCE DAILY FOR 30 DAYS FOR ITCHING, Disp: , Rfl:     MOUNJARO 5  mg/0.5 mL PnIj, SMARTSI Milligram(s) SUB-Q Once a Week, Disp: , Rfl:     MOUNJARO 7.5 mg/0.5 mL PnIj, SMARTSI.5 Milligram(s) SUB-Q Once a Week, Disp: , Rfl:     potassium chloride SA (K-DUR,KLOR-CON) 20 MEQ tablet, Take 20 mEq by mouth once daily. 1/2 daily, Disp: , Rfl:     zolpidem (AMBIEN) 5 MG Tab, Take 1 tablet (5 mg total) by mouth every evening., Disp: 30 tablet, Rfl: 0     Past Medical History:   Diagnosis Date    Herpes simplex virus (HSV) infection     Hypertension     Mild intermittent asthma without complication 2022        Family History   Problem Relation Age of Onset    No Known Problems Father     Diabetes Mother     No Known Problems Son     No Known Problems Son     No Known Problems Daughter         Past Surgical History:   Procedure Laterality Date     SECTION      HYSTERECTOMY      LAPAROSCOPIC SALPINGO-OOPHORECTOMY Left 2021    Procedure: SALPINGO-OOPHORECTOMY, LAPAROSCOPIC;  Surgeon: Maria Tomas MD;  Location: [facility];  Service: OB/GYN;  Laterality: Left;    LAPAROSCOPIC SURGICAL REMOVAL OF ECTOPIC PREGNANCY Right     LAPAROSCOPIC TOTAL HYSTERECTOMY Bilateral 2021    Procedure: HYSTERECTOMY, TOTAL, LAPAROSCOPIC;  Surgeon: Maria Tomas MD;  Location: [facility];  Service: OB/GYN;  Laterality: Bilateral;        Social History     Socioeconomic History    Marital status: Legally    Tobacco Use    Smoking status: Never     Passive exposure: Never    Smokeless tobacco: Never   Substance and Sexual Activity    Alcohol use: Yes     Comment: Socially    Drug use: Never    Sexual activity: Yes     Partners: Male     Birth control/protection: None        Review of Systems   Constitutional:  Negative for fatigue and fever.   HENT:  Negative for ear pain, postnasal drip, rhinorrhea and sinus pressure/congestion.    Respiratory:  Negative for cough and shortness of breath.    Cardiovascular:  Negative for chest pain.   Gastrointestinal:   "Negative for abdominal pain, diarrhea, nausea and vomiting.   Genitourinary:  Negative for dysuria.   Neurological:  Negative for headaches.        /84 (BP Location: Left arm, Patient Position: Sitting, BP Method: Large (Automatic))   Pulse 77   Temp 98.6 °F (37 °C) (Oral)   Resp 18   Ht 5' 1" (1.549 m)   Wt 88.9 kg (196 lb)   LMP  (LMP Unknown)   SpO2 98%   BMI 37.03 kg/m²      Physical Exam  HENT:      Head: Normocephalic and atraumatic.   Cardiovascular:      Rate and Rhythm: Normal rate and regular rhythm.   Pulmonary:      Effort: Pulmonary effort is normal.      Breath sounds: Normal breath sounds.   Musculoskeletal:      Comments: Positive phalen and tinnels   Neurological:      Mental Status: She is alert and oriented to person, place, and time.   Psychiatric:         Mood and Affect: Mood normal.         Behavior: Behavior normal.          Assessment and Plan      ICD-10-CM ICD-9-CM   1. Insomnia, unspecified type  G47.00 780.52   2. Long term use of drug  Z79.899 V58.69   3. Carpal tunnel syndrome of right wrist  G56.01 354.0        1. Insomnia, unspecified type  UDS cam back positive for benzos which patient denies. Will write for one month of ambien. Will send for a definitive test. Will write for other 2 months when test comes back if consistent.   -     POCT Urine Drug Screen Presump  -     Cancel: Benzodiazepine Confirmation, Urine  -     zolpidem (AMBIEN) 5 MG Tab; Take 1 tablet (5 mg total) by mouth every evening.  Dispense: 30 tablet; Refill: 0  -     [facility] GENERIC ORDERABLE CSMPU - Overview: Controlled Substance Monitoring Panel, Random, Urine    2. Long term use of drug  -     POCT Urine Drug Screen Presump  -     Cancel: Benzodiazepine Confirmation, Urine  -     [facility] GENERIC ORDERABLE CSMPU - Overview: Controlled Substance Monitoring Panel, Random, Urine    3. Carpal tunnel syndrome of right wrist  Not controlled despite conservative treatment. Refer to ortho.   -     Ambulatory " referral/consult to Orthopedics; Future; Expected date: 12/28/2023       Follow up in about 3 months (around 2/29/2024).     This information was created by a scribe under my supervision and in my presence. I have reviewed and agree with the scribe's documentation.

## 2023-12-08 LAB — MAYO GENERIC ORDERABLE RESULT: ABNORMAL

## 2023-12-22 DIAGNOSIS — G47.00 INSOMNIA, UNSPECIFIED TYPE: ICD-10-CM

## 2023-12-22 RX ORDER — ZOLPIDEM TARTRATE 5 MG/1
5 TABLET ORAL NIGHTLY
Qty: 30 TABLET | Refills: 1 | Status: SHIPPED | OUTPATIENT
Start: 2023-12-22 | End: 2024-02-21 | Stop reason: SDUPTHER

## 2024-01-09 DIAGNOSIS — M25.531 RIGHT WRIST PAIN: Primary | ICD-10-CM

## 2024-01-10 ENCOUNTER — OFFICE VISIT (OUTPATIENT)
Dept: ORTHOPEDICS | Facility: CLINIC | Age: 39
End: 2024-01-10
Payer: MEDICAID

## 2024-01-10 ENCOUNTER — HOSPITAL ENCOUNTER (OUTPATIENT)
Dept: RADIOLOGY | Facility: HOSPITAL | Age: 39
Discharge: HOME | End: 2024-01-10
Attending: NURSE PRACTITIONER
Payer: MEDICAID

## 2024-01-10 VITALS
BODY MASS INDEX: 37 KG/M2 | WEIGHT: 196 LBS | OXYGEN SATURATION: 97 % | HEART RATE: 85 BPM | RESPIRATION RATE: 20 BRPM | HEIGHT: 61 IN

## 2024-01-10 DIAGNOSIS — G56.01 CARPAL TUNNEL SYNDROME OF RIGHT WRIST: Primary | ICD-10-CM

## 2024-01-10 DIAGNOSIS — M25.531 RIGHT WRIST PAIN: ICD-10-CM

## 2024-01-10 PROCEDURE — 73130 X-RAY EXAM OF HAND: CPT | Mod: 26,RT,, | Performed by: RADIOLOGY

## 2024-01-10 PROCEDURE — 3008F BODY MASS INDEX DOCD: CPT | Mod: CPTII,,, | Performed by: NURSE PRACTITIONER

## 2024-01-10 PROCEDURE — 73110 X-RAY EXAM OF WRIST: CPT | Mod: 26,RT,, | Performed by: RADIOLOGY

## 2024-01-10 PROCEDURE — 73110 X-RAY EXAM OF WRIST: CPT | Mod: TC,RT

## 2024-01-10 PROCEDURE — 99215 OFFICE O/P EST HI 40 MIN: CPT | Mod: PBBFAC | Performed by: NURSE PRACTITIONER

## 2024-01-10 PROCEDURE — 99203 OFFICE O/P NEW LOW 30 MIN: CPT | Mod: S$PBB,,, | Performed by: NURSE PRACTITIONER

## 2024-01-10 PROCEDURE — 1159F MED LIST DOCD IN RCRD: CPT | Mod: CPTII,,, | Performed by: NURSE PRACTITIONER

## 2024-01-10 PROCEDURE — 73130 X-RAY EXAM OF HAND: CPT | Mod: TC,RT

## 2024-01-10 PROCEDURE — 1160F RVW MEDS BY RX/DR IN RCRD: CPT | Mod: CPTII,,, | Performed by: NURSE PRACTITIONER

## 2024-01-10 NOTE — PATIENT INSTRUCTIONS
Safety guidelines activity restrictions are discussed with the patient.  She verbalized understanding.  We will give her a splint to wear at he was scheduled for EMG nerve right upper extremity.  We discussed in general terms possible surgical intervention for carpal tunnel release surgery.  Verbalizes understanding.  We will have return orthopedic clinic with Dr. Garcia with results of the EEG studies.

## 2024-01-10 NOTE — PROGRESS NOTES
38-year-old female presents ambulatory orthopedic clinic sensation and pain in her dominant right hand.  She states she has had 2 pregnancies pretty easily with the last 1 in 2017.  States with both pregnancy she experienced carpal tunnel syndrome.  She began having recurrence of hand pain and numbness proximally 1 year ago.  Symptoms have gotten much worse over the last 6 months.  She has constant decreased sensation in the distribution of the median nerve.  She was dropping things.  She was unable to perform fine motor control activities with her right hand.  She was used over-the-counter.  She was employed as a .    X-ray: X-rays today of the right hand AP, lateral and oblique view shows no evidence of acute fracture, dislocation pathologic bone.      X-ray: X-rays today of the right wrist AP, lateral oblique view shows no evidence of acute fracture, dislocation or pathologic bone.    PE:  In general patient is awake, alert oriented appropriate no acute distress noted.  Respirations unlabored.  Skin is warm dry and intact physical exam of her right elbow she was able fully extend right elbow 0° and flexion proximally degrees.  She would injection before 90°.  Range motion right wrist is equal to that of the left.  Right radial pulse is 2 out of 4.  He was right-hand positive wrist Phalen's changed at 1 minute.    Shoulder: Syndrome    Plan: Safety guidelines activity restrictions are discussed with the patient.  She verbalized understanding.  We will give her a splint to wear at he was scheduled for EMG nerve right upper extremity.  We discussed in general terms possible surgical intervention for carpal tunnel release surgery.  Verbalizes understanding.  We will have return orthopedic clinic with Dr. Garcia with results of the EEG studies.

## 2024-01-19 ENCOUNTER — OFFICE VISIT (OUTPATIENT)
Dept: FAMILY MEDICINE | Facility: CLINIC | Age: 39
End: 2024-01-19
Payer: MEDICAID

## 2024-01-19 VITALS
SYSTOLIC BLOOD PRESSURE: 129 MMHG | DIASTOLIC BLOOD PRESSURE: 87 MMHG | HEART RATE: 81 BPM | OXYGEN SATURATION: 95 % | HEIGHT: 61 IN | WEIGHT: 205 LBS | RESPIRATION RATE: 16 BRPM | TEMPERATURE: 98 F | BODY MASS INDEX: 38.71 KG/M2

## 2024-01-19 DIAGNOSIS — G89.29 CHRONIC PAIN OF LEFT KNEE: Primary | ICD-10-CM

## 2024-01-19 DIAGNOSIS — M25.562 CHRONIC PAIN OF LEFT KNEE: Primary | ICD-10-CM

## 2024-01-19 PROCEDURE — 1159F MED LIST DOCD IN RCRD: CPT | Mod: CPTII,,, | Performed by: FAMILY MEDICINE

## 2024-01-19 PROCEDURE — 96372 THER/PROPH/DIAG INJ SC/IM: CPT | Mod: ,,, | Performed by: FAMILY MEDICINE

## 2024-01-19 PROCEDURE — 3008F BODY MASS INDEX DOCD: CPT | Mod: CPTII,,, | Performed by: FAMILY MEDICINE

## 2024-01-19 PROCEDURE — 3079F DIAST BP 80-89 MM HG: CPT | Mod: CPTII,,, | Performed by: FAMILY MEDICINE

## 2024-01-19 PROCEDURE — 99214 OFFICE O/P EST MOD 30 MIN: CPT | Mod: 25,,, | Performed by: FAMILY MEDICINE

## 2024-01-19 PROCEDURE — 3074F SYST BP LT 130 MM HG: CPT | Mod: CPTII,,, | Performed by: FAMILY MEDICINE

## 2024-01-19 PROCEDURE — 1160F RVW MEDS BY RX/DR IN RCRD: CPT | Mod: CPTII,,, | Performed by: FAMILY MEDICINE

## 2024-01-19 RX ORDER — METHYLPREDNISOLONE ACETATE 40 MG/ML
40 INJECTION, SUSPENSION INTRA-ARTICULAR; INTRALESIONAL; INTRAMUSCULAR; SOFT TISSUE
Status: COMPLETED | OUTPATIENT
Start: 2024-01-19 | End: 2024-01-19

## 2024-01-19 RX ORDER — IBUPROFEN 800 MG/1
800 TABLET ORAL 3 TIMES DAILY
Qty: 90 TABLET | Refills: 0 | Status: SHIPPED | OUTPATIENT
Start: 2024-01-19 | End: 2024-02-21 | Stop reason: SDUPTHER

## 2024-01-19 RX ORDER — DEXAMETHASONE SODIUM PHOSPHATE 4 MG/ML
4 INJECTION, SOLUTION INTRA-ARTICULAR; INTRALESIONAL; INTRAMUSCULAR; INTRAVENOUS; SOFT TISSUE
Status: COMPLETED | OUTPATIENT
Start: 2024-01-19 | End: 2024-01-19

## 2024-01-19 RX ORDER — NAPROXEN 500 MG/1
500 TABLET ORAL 2 TIMES DAILY
COMMUNITY
Start: 2024-01-19 | End: 2024-01-19

## 2024-01-19 RX ORDER — KETOROLAC TROMETHAMINE 30 MG/ML
60 INJECTION, SOLUTION INTRAMUSCULAR; INTRAVENOUS
Status: COMPLETED | OUTPATIENT
Start: 2024-01-19 | End: 2024-01-19

## 2024-01-19 RX ADMIN — METHYLPREDNISOLONE ACETATE 40 MG: 40 INJECTION, SUSPENSION INTRA-ARTICULAR; INTRALESIONAL; INTRAMUSCULAR; SOFT TISSUE at 02:01

## 2024-01-19 RX ADMIN — KETOROLAC TROMETHAMINE 60 MG: 30 INJECTION, SOLUTION INTRAMUSCULAR; INTRAVENOUS at 02:01

## 2024-01-19 RX ADMIN — DEXAMETHASONE SODIUM PHOSPHATE 4 MG: 4 INJECTION, SOLUTION INTRA-ARTICULAR; INTRALESIONAL; INTRAMUSCULAR; INTRAVENOUS; SOFT TISSUE at 02:01
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
## 2024-01-22 DIAGNOSIS — M25.562 ACUTE PAIN OF LEFT KNEE: Primary | ICD-10-CM

## 2024-01-23 ENCOUNTER — OFFICE VISIT (OUTPATIENT)
Dept: ORTHOPEDICS | Facility: CLINIC | Age: 39
End: 2024-01-23
Payer: MEDICAID

## 2024-01-23 ENCOUNTER — HOSPITAL ENCOUNTER (OUTPATIENT)
Dept: RADIOLOGY | Facility: HOSPITAL | Age: 39
Discharge: HOME | End: 2024-01-23
Attending: NURSE PRACTITIONER
Payer: MEDICAID

## 2024-01-23 VITALS — BODY MASS INDEX: 38.71 KG/M2 | WEIGHT: 205 LBS | HEIGHT: 61 IN

## 2024-01-23 DIAGNOSIS — M25.562 CHRONIC PAIN OF LEFT KNEE: ICD-10-CM

## 2024-01-23 DIAGNOSIS — M17.12 PRIMARY OSTEOARTHRITIS OF LEFT KNEE: Primary | ICD-10-CM

## 2024-01-23 DIAGNOSIS — M25.562 ACUTE PAIN OF LEFT KNEE: ICD-10-CM

## 2024-01-23 DIAGNOSIS — G89.29 CHRONIC PAIN OF LEFT KNEE: ICD-10-CM

## 2024-01-23 PROCEDURE — 1160F RVW MEDS BY RX/DR IN RCRD: CPT | Mod: CPTII,,, | Performed by: NURSE PRACTITIONER

## 2024-01-23 PROCEDURE — 99214 OFFICE O/P EST MOD 30 MIN: CPT | Mod: PBBFAC | Performed by: NURSE PRACTITIONER

## 2024-01-23 PROCEDURE — 1159F MED LIST DOCD IN RCRD: CPT | Mod: CPTII,,, | Performed by: NURSE PRACTITIONER

## 2024-01-23 PROCEDURE — 3008F BODY MASS INDEX DOCD: CPT | Mod: CPTII,,, | Performed by: NURSE PRACTITIONER

## 2024-01-23 PROCEDURE — 99214 OFFICE O/P EST MOD 30 MIN: CPT | Mod: S$PBB,,, | Performed by: NURSE PRACTITIONER

## 2024-01-23 NOTE — PATIENT INSTRUCTIONS
Safety guidelines and activity restrictions are discussed with the patient.  She verbalized understanding.  Continue use ibuprofen.  Recommend rest and ice her knee.  We will set her up physical therapy of her left knee for range motion strengthening exercises 1 to 3 times a week x6 weeks.  If she fails to improve she will call back for scheduling.
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
## 2024-02-21 ENCOUNTER — OFFICE VISIT (OUTPATIENT)
Dept: FAMILY MEDICINE | Facility: CLINIC | Age: 39
End: 2024-02-21
Payer: MEDICAID

## 2024-02-21 VITALS
BODY MASS INDEX: 37.38 KG/M2 | DIASTOLIC BLOOD PRESSURE: 75 MMHG | WEIGHT: 198 LBS | HEART RATE: 77 BPM | OXYGEN SATURATION: 97 % | RESPIRATION RATE: 16 BRPM | TEMPERATURE: 99 F | HEIGHT: 61 IN | SYSTOLIC BLOOD PRESSURE: 100 MMHG

## 2024-02-21 DIAGNOSIS — G47.00 INSOMNIA, UNSPECIFIED TYPE: ICD-10-CM

## 2024-02-21 DIAGNOSIS — G89.29 CHRONIC PAIN OF LEFT KNEE: ICD-10-CM

## 2024-02-21 DIAGNOSIS — I10 PRIMARY HYPERTENSION: Primary | ICD-10-CM

## 2024-02-21 DIAGNOSIS — Z79.899 LONG TERM CURRENT USE OF THERAPEUTIC DRUG: ICD-10-CM

## 2024-02-21 DIAGNOSIS — F32.89 OTHER DEPRESSION: ICD-10-CM

## 2024-02-21 DIAGNOSIS — M25.562 CHRONIC PAIN OF LEFT KNEE: ICD-10-CM

## 2024-02-21 LAB

## 2024-02-21 PROCEDURE — 80305 DRUG TEST PRSMV DIR OPT OBS: CPT | Mod: RHCUB | Performed by: FAMILY MEDICINE

## 2024-02-21 PROCEDURE — 3074F SYST BP LT 130 MM HG: CPT | Mod: CPTII,,, | Performed by: FAMILY MEDICINE

## 2024-02-21 PROCEDURE — 1159F MED LIST DOCD IN RCRD: CPT | Mod: CPTII,,, | Performed by: FAMILY MEDICINE

## 2024-02-21 PROCEDURE — 99214 OFFICE O/P EST MOD 30 MIN: CPT | Mod: ,,, | Performed by: FAMILY MEDICINE

## 2024-02-21 PROCEDURE — 3008F BODY MASS INDEX DOCD: CPT | Mod: CPTII,,, | Performed by: FAMILY MEDICINE

## 2024-02-21 PROCEDURE — 1160F RVW MEDS BY RX/DR IN RCRD: CPT | Mod: CPTII,,, | Performed by: FAMILY MEDICINE

## 2024-02-21 PROCEDURE — 3078F DIAST BP <80 MM HG: CPT | Mod: CPTII,,, | Performed by: FAMILY MEDICINE

## 2024-02-21 RX ORDER — IBUPROFEN 800 MG/1
800 TABLET ORAL 3 TIMES DAILY PRN
Qty: 90 TABLET | Refills: 1 | Status: SHIPPED | OUTPATIENT
Start: 2024-02-21 | End: 2024-05-22 | Stop reason: SDUPTHER

## 2024-02-21 RX ORDER — ATENOLOL AND CHLORTHALIDONE TABLET 50; 25 MG/1; MG/1
1 TABLET ORAL DAILY
Qty: 90 TABLET | Refills: 1 | Status: SHIPPED | OUTPATIENT
Start: 2024-02-21

## 2024-02-21 RX ORDER — FLUOXETINE HYDROCHLORIDE 20 MG/1
CAPSULE ORAL
Qty: 90 CAPSULE | Refills: 1 | Status: SHIPPED | OUTPATIENT
Start: 2024-02-21 | End: 2024-05-22 | Stop reason: SDUPTHER

## 2024-02-21 RX ORDER — ZOLPIDEM TARTRATE 5 MG/1
5 TABLET ORAL NIGHTLY
Qty: 30 TABLET | Refills: 2 | Status: SHIPPED | OUTPATIENT
Start: 2024-02-21 | End: 2024-05-22 | Stop reason: SDUPTHER

## 2024-02-21 NOTE — PROGRESS NOTES
New Clinic Note    Felix Smyth is a 38 y.o. female     CC:   Chief Complaint   Patient presents with    Follow-up     Patient is here for her 3 month follow up and medication refill.         Subjective    History of Present Illness HPI   Patient is for evaluation of chronic medical problems. Patient needs refills. Felix  is tolerating medications well without side effects.       Current Outpatient Medications:     albuterol (PROVENTIL/VENTOLIN HFA) 90 mcg/actuation inhaler, albuterol sulfate HFA 90 mcg/actuation aerosol inhaler  Inhale 1 puff every 4-6 hours by inhalation route as needed., Disp: 18 g, Rfl: 5    budesonide-formoterol 160-4.5 mcg (SYMBICORT) 160-4.5 mcg/actuation HFAA, Inhale 2 puffs into the lungs 2 (two) times daily., Disp: , Rfl:     estradioL (ESTRACE) 0.5 MG tablet, Take 1 tablet (0.5 mg total) by mouth once daily., Disp: 30 tablet, Rfl: 11    atenoloL-chlorthalidone (TENORETIC) 50-25 mg Tab, Take 1 tablet by mouth once daily., Disp: 90 tablet, Rfl: 1    FLUoxetine 20 MG capsule, Take 3 tablets by mouth daily, Disp: 90 capsule, Rfl: 1    ibuprofen (ADVIL,MOTRIN) 800 MG tablet, Take 1 tablet (800 mg total) by mouth 3 (three) times daily as needed for Pain., Disp: 90 tablet, Rfl: 1    zolpidem (AMBIEN) 5 MG Tab, Take 1 tablet (5 mg total) by mouth every evening., Disp: 30 tablet, Rfl: 2     Past Medical History:   Diagnosis Date    Herpes simplex virus (HSV) infection     Hypertension     Mild intermittent asthma without complication 2022        Family History   Problem Relation Age of Onset    No Known Problems Father     Diabetes Mother     No Known Problems Son     No Known Problems Son     No Known Problems Daughter         Past Surgical History:   Procedure Laterality Date     SECTION      HYSTERECTOMY      LAPAROSCOPIC SALPINGO-OOPHORECTOMY Left 2021    Procedure: SALPINGO-OOPHORECTOMY, LAPAROSCOPIC;  Surgeon: Maria Tomas MD;  Location: [facility] OR;   "Service: OB/GYN;  Laterality: Left;    LAPAROSCOPIC SURGICAL REMOVAL OF ECTOPIC PREGNANCY Right 2012    LAPAROSCOPIC TOTAL HYSTERECTOMY Bilateral 08/25/2021    Procedure: HYSTERECTOMY, TOTAL, LAPAROSCOPIC;  Surgeon: Maria Tomas MD;  Location: [facility];  Service: OB/GYN;  Laterality: Bilateral;        Review of Systems   Constitutional:  Negative for fatigue and fever.   HENT:  Negative for ear pain, postnasal drip, rhinorrhea and sinus pressure/congestion.    Respiratory:  Negative for cough and shortness of breath.    Cardiovascular:  Negative for chest pain.   Gastrointestinal:  Negative for abdominal pain, diarrhea, nausea and vomiting.   Genitourinary:  Negative for dysuria.   Neurological:  Negative for headaches.        /75 (BP Location: Left arm, Patient Position: Sitting)   Pulse 77   Temp 98.6 °F (37 °C) (Oral)   Resp 16   Ht 5' 1" (1.549 m)   Wt 89.8 kg (198 lb)   LMP  (LMP Unknown)   SpO2 97%   BMI 37.41 kg/m²      Physical Exam  HENT:      Head: Normocephalic and atraumatic.   Cardiovascular:      Rate and Rhythm: Normal rate and regular rhythm.   Pulmonary:      Effort: Pulmonary effort is normal.      Breath sounds: Normal breath sounds.   Neurological:      Mental Status: She is alert and oriented to person, place, and time.   Psychiatric:         Mood and Affect: Mood normal.         Behavior: Behavior normal.          Assessment and Plan      ICD-10-CM ICD-9-CM   1. Primary hypertension  I10 401.9   2. Long term current use of therapeutic drug  Z79.899 V58.69   3. Chronic pain of left knee  M25.562 719.46    G89.29 338.29   4. Insomnia, unspecified type  G47.00 780.52   5. Other depression  F32.89 311        1. Primary hypertension  The current medical regimen is effective;  continue present plan and medications.  -     atenoloL-chlorthalidone (TENORETIC) 50-25 mg Tab; Take 1 tablet by mouth once daily.  Dispense: 90 tablet; Refill: 1    2. Long term current use of " therapeutic drug    and UDS were reviewed and consistent.   -     POCT Urine Drug Screen Presump    3. Chronic pain of left knee  The current medical regimen is effective;  continue present plan and medications.  -     ibuprofen (ADVIL,MOTRIN) 800 MG tablet; Take 1 tablet (800 mg total) by mouth 3 (three) times daily as needed for Pain.  Dispense: 90 tablet; Refill: 1    4. Insomnia, unspecified type  The current medical regimen is effective;  continue present plan and medications.  -     zolpidem (AMBIEN) 5 MG Tab; Take 1 tablet (5 mg total) by mouth every evening.  Dispense: 30 tablet; Refill: 2    5. Other depression  The current medical regimen is effective;  continue present plan and medications.  -     FLUoxetine 20 MG capsule; Take 3 tablets by mouth daily  Dispense: 90 capsule; Refill: 1      Follow up in about 3 months (around 5/21/2024).

## 2024-05-22 ENCOUNTER — OFFICE VISIT (OUTPATIENT)
Dept: FAMILY MEDICINE | Facility: CLINIC | Age: 39
End: 2024-05-22
Payer: MEDICAID

## 2024-05-22 VITALS
TEMPERATURE: 98 F | HEIGHT: 61 IN | BODY MASS INDEX: 38.51 KG/M2 | RESPIRATION RATE: 18 BRPM | SYSTOLIC BLOOD PRESSURE: 109 MMHG | WEIGHT: 204 LBS | OXYGEN SATURATION: 98 % | DIASTOLIC BLOOD PRESSURE: 79 MMHG | HEART RATE: 75 BPM

## 2024-05-22 DIAGNOSIS — G47.00 INSOMNIA, UNSPECIFIED TYPE: Primary | ICD-10-CM

## 2024-05-22 DIAGNOSIS — M25.562 CHRONIC PAIN OF LEFT KNEE: ICD-10-CM

## 2024-05-22 DIAGNOSIS — F32.89 OTHER DEPRESSION: ICD-10-CM

## 2024-05-22 DIAGNOSIS — G56.01 CARPAL TUNNEL SYNDROME OF RIGHT WRIST: ICD-10-CM

## 2024-05-22 DIAGNOSIS — G89.29 CHRONIC PAIN OF LEFT KNEE: ICD-10-CM

## 2024-05-22 DIAGNOSIS — L30.9 ECZEMA, UNSPECIFIED TYPE: ICD-10-CM

## 2024-05-22 PROBLEM — F32.A DEPRESSION: Status: ACTIVE | Noted: 2024-05-22

## 2024-05-22 LAB

## 2024-05-22 PROCEDURE — 80305 DRUG TEST PRSMV DIR OPT OBS: CPT | Mod: RHCUB | Performed by: FAMILY MEDICINE

## 2024-05-22 PROCEDURE — 3078F DIAST BP <80 MM HG: CPT | Mod: CPTII,,, | Performed by: FAMILY MEDICINE

## 2024-05-22 PROCEDURE — 3008F BODY MASS INDEX DOCD: CPT | Mod: CPTII,,, | Performed by: FAMILY MEDICINE

## 2024-05-22 PROCEDURE — 3074F SYST BP LT 130 MM HG: CPT | Mod: CPTII,,, | Performed by: FAMILY MEDICINE

## 2024-05-22 PROCEDURE — 1159F MED LIST DOCD IN RCRD: CPT | Mod: CPTII,,, | Performed by: FAMILY MEDICINE

## 2024-05-22 PROCEDURE — 1160F RVW MEDS BY RX/DR IN RCRD: CPT | Mod: CPTII,,, | Performed by: FAMILY MEDICINE

## 2024-05-22 PROCEDURE — 99214 OFFICE O/P EST MOD 30 MIN: CPT | Mod: ,,, | Performed by: FAMILY MEDICINE

## 2024-05-22 RX ORDER — ZOLPIDEM TARTRATE 5 MG/1
5 TABLET ORAL NIGHTLY
Qty: 30 TABLET | Refills: 2 | Status: SHIPPED | OUTPATIENT
Start: 2024-05-22

## 2024-05-22 RX ORDER — FLUOXETINE HYDROCHLORIDE 20 MG/1
CAPSULE ORAL
Qty: 270 CAPSULE | Refills: 1 | Status: SHIPPED | OUTPATIENT
Start: 2024-05-22

## 2024-05-22 RX ORDER — CYCLOBENZAPRINE HCL 10 MG
10 TABLET ORAL 3 TIMES DAILY
COMMUNITY
Start: 2024-04-01

## 2024-05-22 RX ORDER — IBUPROFEN 800 MG/1
800 TABLET ORAL 3 TIMES DAILY PRN
Qty: 90 TABLET | Refills: 1 | Status: SHIPPED | OUTPATIENT
Start: 2024-05-22

## 2024-05-22 RX ORDER — ALBUTEROL SULFATE 0.83 MG/ML
2.5 SOLUTION RESPIRATORY (INHALATION) EVERY 6 HOURS PRN
COMMUNITY
Start: 2024-04-17

## 2024-05-22 RX ORDER — TRIAMCINOLONE ACETONIDE 1 MG/G
CREAM TOPICAL 2 TIMES DAILY
Qty: 80 G | Refills: 1 | Status: SHIPPED | OUTPATIENT
Start: 2024-05-22

## 2024-05-22 NOTE — PROGRESS NOTES
New Clinic Note    Felix Smyth is a 38 y.o. female     CC:   Chief Complaint   Patient presents with    Insomnia     Patient stated she is here for a three month follow up for insomnia and to get renewal on her Ambien prescription . Tolerating her medication without side effects.     Follow-up    Medication Refill        Subjective    History of Present Illness HPI   Patient is for evaluation of chronic medical problems. Patient needs refills. Felix  is tolerating medications well without side effects.  Patient complains of having a eczema flare up. Patient has not tried any OTC medications or lotions for her symptoms. Patient complains of carpal tunnel syndrome. Her EMG results are in media. It shows severe entrapment in her right hand. She would like to see ortho for this.     Current Outpatient Medications:     albuterol (PROVENTIL) 2.5 mg /3 mL (0.083 %) nebulizer solution, Take 2.5 mg by nebulization every 6 (six) hours as needed for Wheezing., Disp: , Rfl:     albuterol (PROVENTIL/VENTOLIN HFA) 90 mcg/actuation inhaler, albuterol sulfate HFA 90 mcg/actuation aerosol inhaler  Inhale 1 puff every 4-6 hours by inhalation route as needed., Disp: 18 g, Rfl: 5    atenoloL-chlorthalidone (TENORETIC) 50-25 mg Tab, Take 1 tablet by mouth once daily., Disp: 90 tablet, Rfl: 1    budesonide-formoterol 160-4.5 mcg (SYMBICORT) 160-4.5 mcg/actuation HFAA, Inhale 2 puffs into the lungs 2 (two) times daily., Disp: , Rfl:     cyclobenzaprine (FLEXERIL) 10 MG tablet, Take 10 mg by mouth 3 (three) times daily., Disp: , Rfl:     estradioL (ESTRACE) 0.5 MG tablet, Take 1 tablet (0.5 mg total) by mouth once daily., Disp: 30 tablet, Rfl: 11    FLUoxetine 20 MG capsule, Take 3 tablets by mouth daily, Disp: 270 capsule, Rfl: 1    ibuprofen (ADVIL,MOTRIN) 800 MG tablet, Take 1 tablet (800 mg total) by mouth 3 (three) times daily as needed for Pain., Disp: 90 tablet, Rfl: 1    triamcinolone acetonide 0.1% (KENALOG) 0.1 %  cream, Apply topically 2 (two) times daily., Disp: 80 g, Rfl: 1    zolpidem (AMBIEN) 5 MG Tab, Take 1 tablet (5 mg total) by mouth every evening., Disp: 30 tablet, Rfl: 2     Past Medical History:   Diagnosis Date    Herpes simplex virus (HSV) infection     Hypertension     Mild intermittent asthma without complication 2022        Family History   Problem Relation Name Age of Onset    No Known Problems Father      Diabetes Mother      No Known Problems Son      No Known Problems Son      No Known Problems Daughter          Past Surgical History:   Procedure Laterality Date     SECTION      HYSTERECTOMY      LAPAROSCOPIC SALPINGO-OOPHORECTOMY Left 2021    Procedure: SALPINGO-OOPHORECTOMY, LAPAROSCOPIC;  Surgeon: Maria Tomas MD;  Location: [facility];  Service: OB/GYN;  Laterality: Left;    LAPAROSCOPIC SURGICAL REMOVAL OF ECTOPIC PREGNANCY Right     LAPAROSCOPIC TOTAL HYSTERECTOMY Bilateral 2021    Procedure: HYSTERECTOMY, TOTAL, LAPAROSCOPIC;  Surgeon: Maria Tomas MD;  Location: [facility];  Service: OB/GYN;  Laterality: Bilateral;        Social History     Socioeconomic History    Marital status: Legally    Tobacco Use    Smoking status: Never     Passive exposure: Never    Smokeless tobacco: Never   Substance and Sexual Activity    Alcohol use: Yes     Comment: Socially    Drug use: Never    Sexual activity: Yes     Partners: Male     Birth control/protection: None        Review of Systems   Constitutional:  Negative for fatigue and fever.   HENT:  Negative for ear pain, postnasal drip, rhinorrhea and sinus pressure/congestion.    Respiratory:  Negative for cough and shortness of breath.    Cardiovascular:  Negative for chest pain.   Gastrointestinal:  Negative for abdominal pain, diarrhea, nausea and vomiting.   Genitourinary:  Negative for dysuria.   Neurological:  Negative for headaches.        /79 (BP Location: Left arm, Patient Position:  "Sitting, BP Method: Large (Automatic))   Pulse 75   Temp 98 °F (36.7 °C) (Oral)   Resp 18   Ht 5' 1" (1.549 m)   Wt 92.5 kg (204 lb)   LMP  (LMP Unknown)   SpO2 98%   BMI 38.55 kg/m²      Physical Exam  HENT:      Head: Normocephalic and atraumatic.   Cardiovascular:      Rate and Rhythm: Normal rate and regular rhythm.   Pulmonary:      Effort: Pulmonary effort is normal.      Breath sounds: Normal breath sounds.   Skin:     Findings: Rash present.      Comments: Papular erythematous lesion on fingers and knees.    Neurological:      Mental Status: She is alert and oriented to person, place, and time.   Psychiatric:         Mood and Affect: Mood normal.         Behavior: Behavior normal.          Assessment and Plan      ICD-10-CM ICD-9-CM   1. Insomnia, unspecified type  G47.00 780.52   2. Carpal tunnel syndrome of right wrist  G56.01 354.0   3. Other depression  F32.89 311   4. Chronic pain of left knee  M25.562 719.46    G89.29 338.29   5. Eczema, unspecified type  L30.9 692.9        1. Insomnia, unspecified type  The current medical regimen is effective;  continue present plan and medications.    and UDS were reviewed and consistent.   -     POCT Urine Drug Screen Presump  -     zolpidem (AMBIEN) 5 MG Tab; Take 1 tablet (5 mg total) by mouth every evening.  Dispense: 30 tablet; Refill: 2    2. Carpal tunnel syndrome of right wrist  Not controlled. Will refer to ortho.   -     Ambulatory referral/consult to Orthopedics; Future; Expected date: 05/22/2024    3. Other depression  The current medical regimen is effective;  continue present plan and medications.  -     FLUoxetine 20 MG capsule; Take 3 tablets by mouth daily  Dispense: 270 capsule; Refill: 1    4. Chronic pain of left knee  Stable. Continue current meds.   -     ibuprofen (ADVIL,MOTRIN) 800 MG tablet; Take 1 tablet (800 mg total) by mouth 3 (three) times daily as needed for Pain.  Dispense: 90 tablet; Refill: 1    5. Eczema, unspecified " type  Not controlled. Triamcinolone for flare up. Use a good moisturizer daily. Avoid products with perfumes. Wear gloves when doing activities that aggravate condition such as washing dishes and working in the yard.   -     triamcinolone acetonide 0.1% (KENALOG) 0.1 % cream; Apply topically 2 (two) times daily.  Dispense: 80 g; Refill: 1         Patient Instructions   Take Medications as directed  Monitor blood pressure outside of clinic  Eat a heart healthy diet and get plenty of cardiovascular exercise.       Follow up in about 3 months (around 8/22/2024).     This information was created by a scribe under my supervision and in my presence. I have reviewed and agree with the scribe's documentation.

## 2024-05-29 ENCOUNTER — OFFICE VISIT (OUTPATIENT)
Dept: ORTHOPEDICS | Facility: CLINIC | Age: 39
End: 2024-05-29
Payer: MEDICAID

## 2024-05-29 DIAGNOSIS — G56.01 CARPAL TUNNEL SYNDROME OF RIGHT WRIST: ICD-10-CM

## 2024-05-29 PROCEDURE — 99213 OFFICE O/P EST LOW 20 MIN: CPT | Mod: PBBFAC

## 2024-05-29 PROCEDURE — 99213 OFFICE O/P EST LOW 20 MIN: CPT | Mod: S$PBB,,,

## 2024-05-29 NOTE — PROGRESS NOTES
CC: No chief complaint on file.         Felix Smyth is a 38 y.o. female seen today for follow up of No chief complaint on file.  Patient known to orthopedic clinic for knee pain and carpal tunnel syndrome.  Patient presents today with complaints of continued numbness and tingling in her right hand.  Last seen for carpal tunnel symptoms in January by Seth Evans who ordered EMG studies.  Patient was obtained EMG showing severe compression of the median nerve at the carpal tunnel in the right hand/wrist and moderate compression of the left hand/wrist.  Patient states that symptoms are now constant.  She is unable to fix her daughter's hair.  She has difficulty opening jars due to hand weakness.  No new fall or injury.  No other complaints today.      PAST MEDICAL HISTORY:   Past Medical History:   Diagnosis Date    Depression 2024    Herpes simplex virus (HSV) infection     Hypertension     Mild intermittent asthma without complication 2022        PAST SURGICAL HISTORY:   Past Surgical History:   Procedure Laterality Date     SECTION      HYSTERECTOMY      LAPAROSCOPIC SALPINGO-OOPHORECTOMY Left 2021    Procedure: SALPINGO-OOPHORECTOMY, LAPAROSCOPIC;  Surgeon: Maria Tomas MD;  Location: [facility];  Service: OB/GYN;  Laterality: Left;    LAPAROSCOPIC SURGICAL REMOVAL OF ECTOPIC PREGNANCY Right     LAPAROSCOPIC TOTAL HYSTERECTOMY Bilateral 2021    Procedure: HYSTERECTOMY, TOTAL, LAPAROSCOPIC;  Surgeon: Maria Tomas MD;  Location: [facility];  Service: OB/GYN;  Laterality: Bilateral;        ALLERGIES:   Review of patient's allergies indicates:   Allergen Reactions    Oxycodone Hives and Itching    Bactrim [sulfamethoxazole-trimethoprim] Hives    Hydrocodone Itching        MEDICATIONS :    Current Outpatient Medications:     albuterol (PROVENTIL) 2.5 mg /3 mL (0.083 %) nebulizer solution, Take 2.5 mg by nebulization every 6 (six) hours  as needed for Wheezing., Disp: , Rfl:     albuterol (PROVENTIL/VENTOLIN HFA) 90 mcg/actuation inhaler, albuterol sulfate HFA 90 mcg/actuation aerosol inhaler  Inhale 1 puff every 4-6 hours by inhalation route as needed., Disp: 18 g, Rfl: 5    atenoloL-chlorthalidone (TENORETIC) 50-25 mg Tab, Take 1 tablet by mouth once daily., Disp: 90 tablet, Rfl: 1    budesonide-formoterol 160-4.5 mcg (SYMBICORT) 160-4.5 mcg/actuation HFAA, Inhale 2 puffs into the lungs 2 (two) times daily., Disp: , Rfl:     cyclobenzaprine (FLEXERIL) 10 MG tablet, Take 10 mg by mouth 3 (three) times daily., Disp: , Rfl:     estradioL (ESTRACE) 0.5 MG tablet, Take 1 tablet (0.5 mg total) by mouth once daily., Disp: 30 tablet, Rfl: 11    FLUoxetine 20 MG capsule, Take 3 tablets by mouth daily, Disp: 270 capsule, Rfl: 1    ibuprofen (ADVIL,MOTRIN) 800 MG tablet, Take 1 tablet (800 mg total) by mouth 3 (three) times daily as needed for Pain., Disp: 90 tablet, Rfl: 1    triamcinolone acetonide 0.1% (KENALOG) 0.1 % cream, Apply topically 2 (two) times daily., Disp: 80 g, Rfl: 1    zolpidem (AMBIEN) 5 MG Tab, Take 1 tablet (5 mg total) by mouth every evening., Disp: 30 tablet, Rfl: 2     SOCIAL HISTORY:   Social History     Socioeconomic History    Marital status: Legally    Tobacco Use    Smoking status: Never     Passive exposure: Never    Smokeless tobacco: Never   Substance and Sexual Activity    Alcohol use: Yes     Comment: Socially    Drug use: Never    Sexual activity: Yes     Partners: Male     Birth control/protection: None        FAMILY HISTORY:   Family History   Problem Relation Name Age of Onset    No Known Problems Father      Diabetes Mother      No Known Problems Son      No Known Problems Son      No Known Problems Daughter            PHYSICAL EXAM:      There were no vitals filed for this visit.  There is no height or weight on file to calculate BMI.    GENERAL: Well-developed, well-nourished female . The patient is alert,  oriented and cooperative.    EXTREMITIES:   Bilateral wrist with positive Tinel's and Phalen's sign, capillary refill less than 3 seconds bilaterally, neurovascularly intact      RADIOGRAPHIC FINDINGS:   No results found.     Patient Active Problem List    Diagnosis Date Noted    Eczema 05/22/2024    Depression 05/22/2024    Primary osteoarthritis of left knee 01/23/2024    Carpal tunnel syndrome of right wrist 01/10/2024    Insomnia 12/27/2022    Vitamin D deficiency 12/27/2022    Vitamin B12 deficiency 12/27/2022    Essential hypertension 12/27/2022    Mild intermittent asthma without complication 12/07/2022    Polydipsia 08/25/2021    Menorrhagia with regular cycle 08/25/2021    Pelvic pain 08/25/2021     IMPRESSION AND PLAN:   Carpal tunnel syndrome bilaterally, right worse than left.  Patient wants to discuss surgical options as symptoms are now constant.  Personally reviewed patient's EMG results.  Discussed patient with Dr. Mcintosh.  We will schedule her for right carpal tunnel release and preoperative visit with Dr. Mcintosh.  Surgical plan in detail discussed with the patient including all risks, benefits, and alternatives to which patient agreed and plans to proceed with the procedure.        No follow-ups on file.       Jeannie Guidry PA-C      (Subject to voice recognition error, transcription service not allowed)

## 2024-05-29 NOTE — PATIENT INSTRUCTIONS
Your surgery is scheduled for 06/11/2024 at [facility] in [place].    Pre-Op visit -  06/05/2024 @ 9:30am       Our office will contact you the day before surgery with your arrival time.  Do not eat or drink anything after midnight the night before surgery (this includes gum, candy, chewing tobacco, etc).  Bring all medication in their original bottles.  Bathe with Hibiclens the night or morning before your surgery.  The morning of your surgery ONLY take blood pressure, heart, acid reflux, or thyroid (if you take a morning dose) medication.  Take these medications with a sip of water.   Be sure to have stopped your blood thinner medication at the appropriate time, as instructed.  Bring your C-Pap machine if you have one.  All jewelry, piercings, or false eyelashes MUST be removed prior to surgery.

## 2024-06-05 ENCOUNTER — TELEPHONE (OUTPATIENT)
Dept: ORTHOPEDICS | Facility: CLINIC | Age: 39
End: 2024-06-05
Payer: MEDICAID

## 2024-06-05 NOTE — TELEPHONE ENCOUNTER
----- Message from Alice Frank sent at 6/5/2024 12:08 PM CDT -----  Who Called: Felix Oswald-Solorzano    Caller is requesting assistance/information from provider's office.    Preferred Method of Contact: Phone Call  Patient's Preferred Phone Number on File: [phone]   Best Call Back Number, if different:  Additional Information: calling to speak with nurse - states she did not realize that today was an appt with dr ferris prior to surgery on 6/11 - states she was under the impression that it was just to have labs done - pt wanting to know what she needs to do since she missed appt

## 2024-06-10 ENCOUNTER — OFFICE VISIT (OUTPATIENT)
Dept: ORTHOPEDICS | Facility: CLINIC | Age: 39
End: 2024-06-10
Payer: MEDICAID

## 2024-06-10 DIAGNOSIS — G56.01 CARPAL TUNNEL SYNDROME OF RIGHT WRIST: Primary | ICD-10-CM

## 2024-06-10 PROCEDURE — 1159F MED LIST DOCD IN RCRD: CPT | Mod: CPTII,,, | Performed by: ORTHOPAEDIC SURGERY

## 2024-06-10 PROCEDURE — 99214 OFFICE O/P EST MOD 30 MIN: CPT | Mod: S$PBB,,, | Performed by: ORTHOPAEDIC SURGERY

## 2024-06-10 PROCEDURE — 99213 OFFICE O/P EST LOW 20 MIN: CPT | Mod: PBBFAC | Performed by: ORTHOPAEDIC SURGERY

## 2024-06-10 PROCEDURE — 1160F RVW MEDS BY RX/DR IN RCRD: CPT | Mod: CPTII,,, | Performed by: ORTHOPAEDIC SURGERY

## 2024-06-10 NOTE — PROGRESS NOTES
CLINIC NOTE       Chief Complaint   Patient presents with    Right Wrist - Follow-up        Felix Smyth is a 38 y.o. female seen today for evaluation of right wrist pain/finger numbness.  Symptoms began insidiously 3+ years ago.  Describes symptoms typical for carpal tunnel syndrome.  At work she he was a computer constantly with a lot of typing.  She was right-hand dominant.  She was experienced wrist pain and finger numbness in the median nerve distribution.  She was symptoms progress to persistent anesthesia in the median nerve distribution over the past couple of years.  She was seen by [facility] where EMG/nerve conduction studies were performed on 03/08/2024.  Studies were consistent with severe right carpal tunnel syndrome.  She was referred at this time for consideration of right carpal tunnel surgical release.  She was not on any any anticoagulation therapy.  She was reports having bronchospasm during general anesthesia with breast surgery in the past.  She has a history of remote asthma.      Past Medical History:   Diagnosis Date    Depression 5/22/2024    Herpes simplex virus (HSV) infection     Hypertension     Mild intermittent asthma without complication 12/7/2022     Family History   Problem Relation Name Age of Onset    No Known Problems Father      Diabetes Mother      No Known Problems Son      No Known Problems Son      No Known Problems Daughter       Current Outpatient Medications on File Prior to Visit   Medication Sig Dispense Refill    albuterol (PROVENTIL) 2.5 mg /3 mL (0.083 %) nebulizer solution Take 2.5 mg by nebulization every 6 (six) hours as needed for Wheezing.      albuterol (PROVENTIL/VENTOLIN HFA) 90 mcg/actuation inhaler albuterol sulfate HFA 90 mcg/actuation aerosol inhaler   Inhale 1 puff every 4-6 hours by inhalation route as needed. 18 g 5    atenoloL-chlorthalidone (TENORETIC) 50-25 mg Tab Take 1 tablet by mouth once daily. 90 tablet 1     budesonide-formoterol 160-4.5 mcg (SYMBICORT) 160-4.5 mcg/actuation HFAA Inhale 2 puffs into the lungs 2 (two) times daily.      cyclobenzaprine (FLEXERIL) 10 MG tablet Take 10 mg by mouth 3 (three) times daily.      estradioL (ESTRACE) 0.5 MG tablet Take 1 tablet (0.5 mg total) by mouth once daily. 30 tablet 11    FLUoxetine 20 MG capsule Take 3 tablets by mouth daily 270 capsule 1    ibuprofen (ADVIL,MOTRIN) 800 MG tablet Take 1 tablet (800 mg total) by mouth 3 (three) times daily as needed for Pain. 90 tablet 1    triamcinolone acetonide 0.1% (KENALOG) 0.1 % cream Apply topically 2 (two) times daily. 80 g 1    zolpidem (AMBIEN) 5 MG Tab Take 1 tablet (5 mg total) by mouth every evening. 30 tablet 2     No current facility-administered medications on file prior to visit.       ROS     There were no vitals filed for this visit.    Past Surgical History:   Procedure Laterality Date     SECTION      HYSTERECTOMY      LAPAROSCOPIC SALPINGO-OOPHORECTOMY Left 2021    Procedure: SALPINGO-OOPHORECTOMY, LAPAROSCOPIC;  Surgeon: Maria Tomas MD;  Location: [facility] OR;  Service: OB/GYN;  Laterality: Left;    LAPAROSCOPIC SURGICAL REMOVAL OF ECTOPIC PREGNANCY Right     LAPAROSCOPIC TOTAL HYSTERECTOMY Bilateral 2021    Procedure: HYSTERECTOMY, TOTAL, LAPAROSCOPIC;  Surgeon: Maria Tomas MD;  Location: [facility] OR;  Service: OB/GYN;  Laterality: Bilateral;        Review of patient's allergies indicates:   Allergen Reactions    Bactrim [sulfamethoxazole-trimethoprim] Hives    Hydrocodone Itching        Ortho Exam :  Well-developed well-nourished black female no acute distress.  Alert oriented cooperative.  Neck is supple without JVD.  Breathing is regular nonlabored.  Skin is warm and dry no lesions seen.  She was good motion of the shoulder elbow forearm and wrist.  There is decreased sensation light touch in the median nerve distribution of the right hand.  No thenar eminence wasting.   Positive Tinel's test over the volar carpus.  X-rays right wrist 01/10/2024 shows the carpus to be normally aligned.  No evidence of fracture, dislocation, pathologic bone or significant DJD.  Radiographic Examination:    Technique:    Findings:    Impression:   See Above    Assessment and Plan  Patient Active Problem List    Diagnosis Date Noted    Eczema 05/22/2024    Depression 05/22/2024    Primary osteoarthritis of left knee 01/23/2024    Carpal tunnel syndrome of right wrist 01/10/2024    Insomnia 12/27/2022    Vitamin D deficiency 12/27/2022    Vitamin B12 deficiency 12/27/2022    Essential hypertension 12/27/2022    Mild intermittent asthma without complication 12/07/2022    Polydipsia 08/25/2021    Menorrhagia with regular cycle 08/25/2021    Pelvic pain 08/25/2021    Impression: Severe chronic right carpal tunnel syndrome  Plan:  Right carpal tunnel surgical release outpatient beer block anesthesia discussed.  The potential benefits and risks of surgery outlined to include but not limited to bleeding, infection, damage to blood vessels and nerves, need for further surgery, other risks complications including even death the patient wished to proceed.  We discussed expectations with relief of pain symptoms followed hopefully by sensory nerve recovery over time.      Waldemar Mcintosh M.D.

## 2024-06-11 ENCOUNTER — ANESTHESIA (OUTPATIENT)
Dept: SURGERY | Facility: HOSPITAL | Age: 39
End: 2024-06-11
Payer: MEDICAID

## 2024-06-11 ENCOUNTER — HOSPITAL ENCOUNTER (OUTPATIENT)
Facility: HOSPITAL | Age: 39
Discharge: HOME | End: 2024-06-11
Attending: ORTHOPAEDIC SURGERY | Admitting: ORTHOPAEDIC SURGERY
Payer: MEDICAID

## 2024-06-11 ENCOUNTER — ANESTHESIA EVENT (OUTPATIENT)
Dept: SURGERY | Facility: HOSPITAL | Age: 39
End: 2024-06-11
Payer: MEDICAID

## 2024-06-11 VITALS
SYSTOLIC BLOOD PRESSURE: 127 MMHG | OXYGEN SATURATION: 97 % | RESPIRATION RATE: 18 BRPM | DIASTOLIC BLOOD PRESSURE: 89 MMHG | TEMPERATURE: 98 F | HEART RATE: 67 BPM

## 2024-06-11 DIAGNOSIS — G56.01 CARPAL TUNNEL SYNDROME OF RIGHT WRIST: Primary | ICD-10-CM

## 2024-06-11 PROCEDURE — 25000003 PHARM REV CODE 250: Performed by: NURSE ANESTHETIST, CERTIFIED REGISTERED

## 2024-06-11 PROCEDURE — 71000015 HC POSTOP RECOV 1ST HR: Performed by: ORTHOPAEDIC SURGERY

## 2024-06-11 PROCEDURE — 64721 CARPAL TUNNEL SURGERY: CPT | Mod: RT,,, | Performed by: ORTHOPAEDIC SURGERY

## 2024-06-11 PROCEDURE — 63600175 PHARM REV CODE 636 W HCPCS: Performed by: NURSE ANESTHETIST, CERTIFIED REGISTERED

## 2024-06-11 PROCEDURE — 71000016 HC POSTOP RECOV ADDL HR: Performed by: ORTHOPAEDIC SURGERY

## 2024-06-11 PROCEDURE — 37000008 HC ANESTHESIA 1ST 15 MINUTES: Performed by: ORTHOPAEDIC SURGERY

## 2024-06-11 PROCEDURE — 37000009 HC ANESTHESIA EA ADD 15 MINS: Performed by: ORTHOPAEDIC SURGERY

## 2024-06-11 PROCEDURE — 63600175 PHARM REV CODE 636 W HCPCS: Performed by: ORTHOPAEDIC SURGERY

## 2024-06-11 PROCEDURE — D9220A PRA ANESTHESIA: Mod: ANES,,, | Performed by: ANESTHESIOLOGY

## 2024-06-11 PROCEDURE — 25000003 PHARM REV CODE 250: Performed by: ORTHOPAEDIC SURGERY

## 2024-06-11 PROCEDURE — D9220A PRA ANESTHESIA: Mod: QX,CRNA,, | Performed by: NURSE ANESTHETIST, CERTIFIED REGISTERED

## 2024-06-11 PROCEDURE — 36000706: Performed by: ORTHOPAEDIC SURGERY

## 2024-06-11 PROCEDURE — 36000707: Performed by: ORTHOPAEDIC SURGERY

## 2024-06-11 PROCEDURE — 63600175 PHARM REV CODE 636 W HCPCS: Mod: JZ,JG | Performed by: ORTHOPAEDIC SURGERY

## 2024-06-11 PROCEDURE — 71000033 HC RECOVERY, INTIAL HOUR: Performed by: ORTHOPAEDIC SURGERY

## 2024-06-11 RX ORDER — HYDROMORPHONE HYDROCHLORIDE 2 MG/ML
0.5 INJECTION, SOLUTION INTRAMUSCULAR; INTRAVENOUS; SUBCUTANEOUS EVERY 5 MIN PRN
Status: DISCONTINUED | OUTPATIENT
Start: 2024-06-11 | End: 2024-06-11 | Stop reason: HOSPADM

## 2024-06-11 RX ORDER — LIDOCAINE HYDROCHLORIDE 20 MG/ML
INJECTION, SOLUTION EPIDURAL; INFILTRATION; INTRACAUDAL; PERINEURAL
Status: DISCONTINUED | OUTPATIENT
Start: 2024-06-11 | End: 2024-06-11

## 2024-06-11 RX ORDER — ONDANSETRON 4 MG/1
8 TABLET, ORALLY DISINTEGRATING ORAL EVERY 8 HOURS PRN
Status: DISCONTINUED | OUTPATIENT
Start: 2024-06-11 | End: 2024-06-11 | Stop reason: HOSPADM

## 2024-06-11 RX ORDER — DIMENHYDRINATE 50 MG
50 TABLET ORAL ONCE
Status: DISCONTINUED | OUTPATIENT
Start: 2024-06-11 | End: 2024-06-11 | Stop reason: HOSPADM

## 2024-06-11 RX ORDER — FENTANYL CITRATE 50 UG/ML
INJECTION, SOLUTION INTRAMUSCULAR; INTRAVENOUS
Status: DISCONTINUED | OUTPATIENT
Start: 2024-06-11 | End: 2024-06-11

## 2024-06-11 RX ORDER — MORPHINE SULFATE 10 MG/ML
4 INJECTION INTRAMUSCULAR; INTRAVENOUS; SUBCUTANEOUS EVERY 5 MIN PRN
Status: DISCONTINUED | OUTPATIENT
Start: 2024-06-11 | End: 2024-06-11 | Stop reason: HOSPADM

## 2024-06-11 RX ORDER — HYDROCODONE BITARTRATE AND ACETAMINOPHEN 10; 325 MG/1; MG/1
1 TABLET ORAL EVERY 4 HOURS PRN
Status: DISCONTINUED | OUTPATIENT
Start: 2024-06-11 | End: 2024-06-11 | Stop reason: HOSPADM

## 2024-06-11 RX ORDER — HYDROCODONE BITARTRATE AND ACETAMINOPHEN 5; 325 MG/1; MG/1
1 TABLET ORAL EVERY 4 HOURS PRN
Status: DISCONTINUED | OUTPATIENT
Start: 2024-06-11 | End: 2024-06-11 | Stop reason: HOSPADM

## 2024-06-11 RX ORDER — SODIUM CHLORIDE 9 MG/ML
INJECTION, SOLUTION INTRAVENOUS CONTINUOUS
Status: DISCONTINUED | OUTPATIENT
Start: 2024-06-11 | End: 2024-06-11 | Stop reason: HOSPADM

## 2024-06-11 RX ORDER — DIPHENHYDRAMINE HYDROCHLORIDE 50 MG/ML
25 INJECTION INTRAMUSCULAR; INTRAVENOUS EVERY 6 HOURS PRN
Status: DISCONTINUED | OUTPATIENT
Start: 2024-06-11 | End: 2024-06-11 | Stop reason: HOSPADM

## 2024-06-11 RX ORDER — PROPOFOL 10 MG/ML
VIAL (ML) INTRAVENOUS
Status: DISCONTINUED | OUTPATIENT
Start: 2024-06-11 | End: 2024-06-11

## 2024-06-11 RX ORDER — PROMETHAZINE HYDROCHLORIDE 25 MG/1
25 TABLET ORAL EVERY 6 HOURS PRN
Status: DISCONTINUED | OUTPATIENT
Start: 2024-06-11 | End: 2024-06-11 | Stop reason: HOSPADM

## 2024-06-11 RX ORDER — ACETAMINOPHEN 500 MG
1000 TABLET ORAL EVERY 6 HOURS PRN
Status: DISCONTINUED | OUTPATIENT
Start: 2024-06-11 | End: 2024-06-11 | Stop reason: HOSPADM

## 2024-06-11 RX ORDER — ONDANSETRON HYDROCHLORIDE 2 MG/ML
4 INJECTION, SOLUTION INTRAVENOUS DAILY PRN
Status: DISCONTINUED | OUTPATIENT
Start: 2024-06-11 | End: 2024-06-11 | Stop reason: HOSPADM

## 2024-06-11 RX ORDER — BUPIVACAINE HYDROCHLORIDE 2.5 MG/ML
INJECTION, SOLUTION EPIDURAL; INFILTRATION; INTRACAUDAL
Status: DISCONTINUED | OUTPATIENT
Start: 2024-06-11 | End: 2024-06-11 | Stop reason: HOSPADM

## 2024-06-11 RX ORDER — MIDAZOLAM HYDROCHLORIDE 1 MG/ML
INJECTION INTRAMUSCULAR; INTRAVENOUS
Status: DISCONTINUED | OUTPATIENT
Start: 2024-06-11 | End: 2024-06-11

## 2024-06-11 RX ORDER — HYDROCODONE BITARTRATE AND ACETAMINOPHEN 5; 325 MG/1; MG/1
1 TABLET ORAL EVERY 6 HOURS PRN
Qty: 28 TABLET | Refills: 0 | Status: SHIPPED | OUTPATIENT
Start: 2024-06-11 | End: 2024-06-18

## 2024-06-11 RX ORDER — MEPERIDINE HYDROCHLORIDE 25 MG/ML
25 INJECTION INTRAMUSCULAR; INTRAVENOUS; SUBCUTANEOUS EVERY 10 MIN PRN
Status: DISCONTINUED | OUTPATIENT
Start: 2024-06-11 | End: 2024-06-11 | Stop reason: HOSPADM

## 2024-06-11 RX ADMIN — LIDOCAINE HYDROCHLORIDE 80 MG: 20 INJECTION, SOLUTION INTRAVENOUS at 01:06

## 2024-06-11 RX ADMIN — FENTANYL CITRATE 50 MCG: 50 INJECTION INTRAMUSCULAR; INTRAVENOUS at 02:06

## 2024-06-11 RX ADMIN — MIDAZOLAM HYDROCHLORIDE 2 MG: 1 INJECTION, SOLUTION INTRAMUSCULAR; INTRAVENOUS at 01:06

## 2024-06-11 RX ADMIN — FENTANYL CITRATE 50 MCG: 50 INJECTION INTRAMUSCULAR; INTRAVENOUS at 01:06

## 2024-06-11 RX ADMIN — PROPOFOL 50 MG: 10 INJECTION, EMULSION INTRAVENOUS at 01:06

## 2024-06-11 RX ADMIN — CEFAZOLIN 2 G: 2 INJECTION, POWDER, FOR SOLUTION INTRAMUSCULAR; INTRAVENOUS at 02:06

## 2024-06-11 RX ADMIN — HYDROCODONE BITARTRATE AND ACETAMINOPHEN 1 TABLET: 5; 325 TABLET ORAL at 03:06

## 2024-06-11 RX ADMIN — SODIUM CHLORIDE: 9 INJECTION, SOLUTION INTRAVENOUS at 01:06

## 2024-06-11 NOTE — TRANSFER OF CARE
Anesthesia Transfer of Care Note    Patient: [facility]    Procedure(s) Performed: Procedure(s) (LRB):  RELEASE, CARPAL TUNNEL (Right)    Patient location: PACU    Anesthesia Type: general and regional    Transport from OR: Transported from OR on room air with adequate spontaneous ventilation    Post pain: adequate analgesia    Post assessment: no apparent anesthetic complications    Post vital signs: stable    Level of consciousness: responds to stimulation and awake    Nausea/Vomiting: no nausea/vomiting    Complications: none    Transfer of care protocol was followedComments: Good SV continue, NAD noted, VSS, RTRN      Last vitals: Visit Vitals  /70   Pulse 93   Temp 36.5 °C (97.7 °F)   Resp 18   LMP  (LMP Unknown)   SpO2 97%   Breastfeeding No

## 2024-06-11 NOTE — DISCHARGE SUMMARY
[facility] - Orthopedic Periop Services  Discharge Note  Short Stay    Procedure(s) (LRB):  RELEASE, CARPAL TUNNEL (Right)      OUTCOME: Patient tolerated treatment/procedure well without complication and is now ready for discharge.    DISPOSITION: Home or Self Care    FINAL DIAGNOSIS:  Carpal tunnel syndrome of right wrist    FOLLOWUP: In clinic    DISCHARGE INSTRUCTIONS:    Discharge Procedure Orders   Diet general     Keep surgical extremity elevated     Ice to affected area   Order Comments: using barrier between ice and skin (specify duration&frequency)     Call MD for:  temperature >100.4     Call MD for:  persistent nausea and vomiting     Call MD for:  severe uncontrolled pain     Call MD for:  difficulty breathing, headache or visual disturbances     Call MD for:  redness, tenderness, or signs of infection (pain, swelling, redness, odor or green/yellow discharge around incision site)     Call MD for:  hives     Call MD for:  persistent dizziness or light-headedness     Call MD for:  extreme fatigue     Leave dressing on - Keep it clean, dry, and intact until clinic visit     Activity as tolerated     Shower on day dressing removed (No bath)     Weight bearing as tolerated        TIME SPENT ON DISCHARGE: 15 minutes

## 2024-06-11 NOTE — OP NOTE
[facility] - Orthopedic Periop Services  Surgery Department  Operative Note    SUMMARY     Date of Procedure: 6/11/2024     Procedure: Procedure(s) (LRB):  RELEASE, CARPAL TUNNEL (Right)     Surgeons and Role:     * Waldemar Mcintosh MD - Primary    Assisting Surgeon: None    Pre-Operative Diagnosis: Carpal tunnel syndrome of right wrist [G56.01]    Post-Operative Diagnosis: Post-Op Diagnosis Codes:     * Carpal tunnel syndrome of right wrist [G56.01]    Anesthesia: [place] block    Technical Procedures Used:  Right carpal tunnel release             DEPARTMENT OF ORTHOPEDIC SURGERY                OPERATIVE REPORT     NAME:  Felix Smyth  MRN: [id number]               DATE OF SURGERY:  06/11/2024       PREOPERATIVE DIAGNOSIS:  right carpal tunnel syndrome    POSTOPERATIVE DIAGNOSIS:  right carpal tunnel syndrome    ANESTHESIA:  Vidhya block    PROCEDURE:  rightcarpal tunnel surgical release    PROCEDURE IN DETAIL:  The patient was taken to the operating room and placed in the supine position.  After an adequate level of regional vidhya block anesthesia had been achieved (SeeAnesthesia Note). The patients right upper extremity was scrubbed with Betadine and prepped in sterile fashion.  The operation was begun by making a linear skin incision from mid palm to the distal volar wrist crease.  The incision was carried carefully through the subcutaneous layers.  Now the transverse palmar fascia and volar carpal ligament were incised in line with the skin incision. A freer elevator was placed between the overlying fascia and underlying carpal canal contents.  Inspection revealed flexor tenosynovitis.  Inspection of the median nerve revealed constrictions beneath the volar carpal ligament.  A careful external neurolysis was performed using Loupe magnification.  A limited tenosynovectomy was performed.  Skin margins were infiltrated with ½% Marcaine without Epinephrine.   The tourniquet was let down and hemostasis was  achieved with the bipolar electrocautery.  The wound was irrigated copiously with antibiotic solution and the skin margins were reapproximated with running 5-0 Nylon suture.  The wound was dressed sterilely.  The patient was taken to the recovery room in satisfactory condition.  Estimated blood loss:  Minimal.  The patient received one gram of Ancef antibiotic intravenously prior to the procedure. Tourniquet time: 23min    Waldemar Mcintosh       Description of the Findings of the Procedure:  Right carpal tunnel    Significant Surgical Tasks Conducted by the Assistant(s), if Applicable:     Complications: No    Estimated Blood Loss (EBL): * No values recorded between 6/11/2024  2:11 PM and 6/11/2024  2:38 PM *           Implants: * No implants in log *    Specimens:   Specimen (24h ago, onward)      None                    Condition: Good    Disposition: PACU - hemodynamically stable.    Attestation: I was present and scrubbed for the entire procedure.

## 2024-06-11 NOTE — INTERVAL H&P NOTE
The patient has been examined and the H&P has been reviewed:    I concur with the findings and no changes have occurred since H&P was written.    Surgery risks, benefits and alternative options discussed and understood by patient/family.          Active Hospital Problems    Diagnosis  POA    *Carpal tunnel syndrome of right wrist [G56.01]  Yes      Resolved Hospital Problems   No resolved problems to display.

## 2024-06-11 NOTE — BRIEF OP NOTE
[facility] - Orthopedic Periop Services  Brief Operative Note    Surgery Date: 6/11/2024     Surgeons and Role:     * Waldemar Mcintosh MD - Primary    Assisting Surgeon: None    Pre-op Diagnosis:  Carpal tunnel syndrome of right wrist [G56.01]    Post-op Diagnosis:  Post-Op Diagnosis Codes:     * Carpal tunnel syndrome of right wrist [G56.01]    Procedure(s) (LRB):  RELEASE, CARPAL TUNNEL (Right)    Anesthesia: [place] block    Description of the findings of the procedure(s): See Op Note     Estimated Blood Loss: * No values recorded between 6/11/2024  2:11 PM and 6/11/2024  2:38 PM *3cc         Specimens:   Specimen (24h ago, onward)      None              Discharge Note    OUTCOME: Patient tolerated treatment/procedure well without complication and is now ready for discharge.    DISPOSITION: Home or Self Care    FINAL DIAGNOSIS:  Carpal tunnel syndrome of right wrist    FOLLOWUP: In clinic    DISCHARGE INSTRUCTIONS:    Discharge Procedure Orders   Diet general     Keep surgical extremity elevated     Ice to affected area   Order Comments: using barrier between ice and skin (specify duration&frequency)     Call MD for:  temperature >100.4     Call MD for:  persistent nausea and vomiting     Call MD for:  severe uncontrolled pain     Call MD for:  difficulty breathing, headache or visual disturbances     Call MD for:  redness, tenderness, or signs of infection (pain, swelling, redness, odor or green/yellow discharge around incision site)     Call MD for:  hives     Call MD for:  persistent dizziness or light-headedness     Call MD for:  extreme fatigue     Leave dressing on - Keep it clean, dry, and intact until clinic visit     Activity as tolerated     Shower on day dressing removed (No bath)     Weight bearing as tolerated

## 2024-06-11 NOTE — H&P
[facility] - Orthopedic Periop Services  Orthopedics  H&P    Patient Name: Felix Smyth  MRN: [id number]  Admission Date: (Not on file)  Primary Care Provider: Felisa Galeas MD    Patient information was obtained from patient and ER records.     Subjective:     Principal Problem:Carpal tunnel syndrome of right wrist    Chief Complaint: No chief complaint on file.       HPI: Chief complaint:  Right wrist pain/finger numbness  History:   Felix Smyth is a 38 y.o. female seen today for evaluation of right wrist pain/finger numbness.  Symptoms began insidiously 3+ years ago.  Describes symptoms typical for carpal tunnel syndrome.  At work she he was a computer constantly with a lot of typing.  She was right-hand dominant.  She was experienced wrist pain and finger numbness in the median nerve distribution.  She was symptoms progress to persistent anesthesia in the median nerve distribution over the past couple of years.  She was seen by [facility] in [place] where EMG/nerve conduction studies were performed on 03/08/2024.  Studies were consistent with severe right carpal tunnel syndrome.  She was referred at this time for consideration of right carpal tunnel surgical release.  She was not on any any anticoagulation therapy.  She was reports having bronchospasm during general anesthesia with breast surgery in the past.  She has a history of remote asthma.    Impression: Severe right carpal tunnel syndrome   Plan: Right carpal tunnel surgical release outpatient anesthesia of choice.  The potential benefits and risks surgery outlined to include but not limited to bleeding, infection, damage to blood vessels and nerves, need for further surgery, other risks and complications including even death the patient wished to proceed.           Past Medical History:   Diagnosis Date    Depression 5/22/2024    Herpes simplex virus (HSV) infection     Hypertension     Mild intermittent asthma without  complication 2022       Past Surgical History:   Procedure Laterality Date     SECTION      HYSTERECTOMY      LAPAROSCOPIC SALPINGO-OOPHORECTOMY Left 2021    Procedure: SALPINGO-OOPHORECTOMY, LAPAROSCOPIC;  Surgeon: Maria Tomas MD;  Location: [facility] OR;  Service: OB/GYN;  Laterality: Left;    LAPAROSCOPIC SURGICAL REMOVAL OF ECTOPIC PREGNANCY Right     LAPAROSCOPIC TOTAL HYSTERECTOMY Bilateral 2021    Procedure: HYSTERECTOMY, TOTAL, LAPAROSCOPIC;  Surgeon: Maria Tomas MD;  Location: [facility] OR;  Service: OB/GYN;  Laterality: Bilateral;       Review of patient's allergies indicates:   Allergen Reactions    Bactrim [sulfamethoxazole-trimethoprim] Hives    Hydrocodone Itching       No current facility-administered medications for this encounter.     Current Outpatient Medications   Medication Sig    albuterol (PROVENTIL) 2.5 mg /3 mL (0.083 %) nebulizer solution Take 2.5 mg by nebulization every 6 (six) hours as needed for Wheezing.    albuterol (PROVENTIL/VENTOLIN HFA) 90 mcg/actuation inhaler albuterol sulfate HFA 90 mcg/actuation aerosol inhaler   Inhale 1 puff every 4-6 hours by inhalation route as needed.    atenoloL-chlorthalidone (TENORETIC) 50-25 mg Tab Take 1 tablet by mouth once daily.    budesonide-formoterol 160-4.5 mcg (SYMBICORT) 160-4.5 mcg/actuation HFAA Inhale 2 puffs into the lungs 2 (two) times daily.    cyclobenzaprine (FLEXERIL) 10 MG tablet Take 10 mg by mouth 3 (three) times daily.    estradioL (ESTRACE) 0.5 MG tablet Take 1 tablet (0.5 mg total) by mouth once daily.    FLUoxetine 20 MG capsule Take 3 tablets by mouth daily    ibuprofen (ADVIL,MOTRIN) 800 MG tablet Take 1 tablet (800 mg total) by mouth 3 (three) times daily as needed for Pain.    triamcinolone acetonide 0.1% (KENALOG) 0.1 % cream Apply topically 2 (two) times daily.    zolpidem (AMBIEN) 5 MG Tab Take 1 tablet (5 mg total) by mouth every evening.     Family History       Problem  Relation (Age of Onset)    Diabetes Mother    No Known Problems Father, Son, Son, Daughter          Tobacco Use    Smoking status: Never     Passive exposure: Never    Smokeless tobacco: Never   Substance and Sexual Activity    Alcohol use: Yes     Comment: Socially    Drug use: Never    Sexual activity: Yes     Partners: Male     Birth control/protection: None     Review of Systems   Constitutional: Negative.     Objective:     Vital Signs (Most Recent):    Vital Signs (24h Range):              There is no height or weight on file to calculate BMI.    No intake or output data in the 24 hours ending 06/11/24 0907     General    Vitals reviewed.  Constitutional: She is oriented to person, place, and time. She appears well-developed and well-nourished.   HENT:   Head: Normocephalic and atraumatic.   Eyes: EOM are normal. Pupils are equal, round, and reactive to light.   Neck: Neck supple.   Cardiovascular:  Normal rate, regular rhythm and normal heart sounds.            Pulmonary/Chest: Effort normal and breath sounds normal.   Abdominal: Soft. Bowel sounds are normal.   Neurological: She is alert and oriented to person, place, and time.   Psychiatric: She has a normal mood and affect. Her behavior is normal.             Right Hand/Wrist Exam     Tests   Phalens sign: positive  Tinel's sign (median nerve): positive  Carpal Tunnel Compression Test: positive      Other     Neuorologic Exam    Median Distribution: abnormal  Ulnar Distribution: normal  Radial Distribution: normal      Left Hand/Wrist Exam   Left hand exam is normal.          Vascular Exam       Capillary Refill  Right Hand: normal capillary refill  Right Christoph's Test: rapid refill           Significant Labs: All pertinent labs within the past 24 hours have been reviewed.    Significant Imaging: I have reviewed all pertinent imaging results/findings.  Assessment/Plan:     No notes have been filed under this hospital service.  Service: Orthopedic  Surgery      Waldemar Mcintosh MD  Orthopedics  [facility] - Orthopedic Periop Services

## 2024-06-11 NOTE — HPI
Chief complaint:  Right wrist pain/finger numbness  History:   Felix Smyth is a 38 y.o. female seen today for evaluation of right wrist pain/finger numbness.  Symptoms began insidiously 3+ years ago.  Describes symptoms typical for carpal tunnel syndrome.  At work she he was a computer constantly with a lot of typing.  She was right-hand dominant.  She was experienced wrist pain and finger numbness in the median nerve distribution.  She was symptoms progress to persistent anesthesia in the median nerve distribution over the past couple of years.  She was seen by [facility] where EMG/nerve conduction studies were performed on 03/08/2024.  Studies were consistent with severe right carpal tunnel syndrome.  She was referred at this time for consideration of right carpal tunnel surgical release.  She was not on any any anticoagulation therapy.  She was reports having bronchospasm during general anesthesia with breast surgery in the past.  She has a history of remote asthma.    Impression: Severe right carpal tunnel syndrome   Plan: Right carpal tunnel surgical release outpatient anesthesia of choice.  The potential benefits and risks surgery outlined to include but not limited to bleeding, infection, damage to blood vessels and nerves, need for further surgery, other risks and complications including even death the patient wished to proceed.

## 2024-06-11 NOTE — SUBJECTIVE & OBJECTIVE
Past Medical History:   Diagnosis Date    Depression 2024    Herpes simplex virus (HSV) infection     Hypertension     Mild intermittent asthma without complication 2022       Past Surgical History:   Procedure Laterality Date     SECTION      HYSTERECTOMY      LAPAROSCOPIC SALPINGO-OOPHORECTOMY Left 2021    Procedure: SALPINGO-OOPHORECTOMY, LAPAROSCOPIC;  Surgeon: Maria Tomas MD;  Location: [facility];  Service: OB/GYN;  Laterality: Left;    LAPAROSCOPIC SURGICAL REMOVAL OF ECTOPIC PREGNANCY Right     LAPAROSCOPIC TOTAL HYSTERECTOMY Bilateral 2021    Procedure: HYSTERECTOMY, TOTAL, LAPAROSCOPIC;  Surgeon: Maria Tomas MD;  Location: [facility];  Service: OB/GYN;  Laterality: Bilateral;       Review of patient's allergies indicates:   Allergen Reactions    Bactrim [sulfamethoxazole-trimethoprim] Hives    Hydrocodone Itching       No current facility-administered medications for this encounter.     Current Outpatient Medications   Medication Sig    albuterol (PROVENTIL) 2.5 mg /3 mL (0.083 %) nebulizer solution Take 2.5 mg by nebulization every 6 (six) hours as needed for Wheezing.    albuterol (PROVENTIL/VENTOLIN HFA) 90 mcg/actuation inhaler albuterol sulfate HFA 90 mcg/actuation aerosol inhaler   Inhale 1 puff every 4-6 hours by inhalation route as needed.    atenoloL-chlorthalidone (TENORETIC) 50-25 mg Tab Take 1 tablet by mouth once daily.    budesonide-formoterol 160-4.5 mcg (SYMBICORT) 160-4.5 mcg/actuation HFAA Inhale 2 puffs into the lungs 2 (two) times daily.    cyclobenzaprine (FLEXERIL) 10 MG tablet Take 10 mg by mouth 3 (three) times daily.    estradioL (ESTRACE) 0.5 MG tablet Take 1 tablet (0.5 mg total) by mouth once daily.    FLUoxetine 20 MG capsule Take 3 tablets by mouth daily    ibuprofen (ADVIL,MOTRIN) 800 MG tablet Take 1 tablet (800 mg total) by mouth 3 (three) times daily as needed for Pain.    triamcinolone acetonide 0.1% (KENALOG) 0.1 %  cream Apply topically 2 (two) times daily.    zolpidem (AMBIEN) 5 MG Tab Take 1 tablet (5 mg total) by mouth every evening.     Family History       Problem Relation (Age of Onset)    Diabetes Mother    No Known Problems Father, Son, Son, Daughter          Tobacco Use    Smoking status: Never     Passive exposure: Never    Smokeless tobacco: Never   Substance and Sexual Activity    Alcohol use: Yes     Comment: Socially    Drug use: Never    Sexual activity: Yes     Partners: Male     Birth control/protection: None     Review of Systems   Constitutional: Negative.     Objective:     Vital Signs (Most Recent):    Vital Signs (24h Range):              There is no height or weight on file to calculate BMI.    No intake or output data in the 24 hours ending 06/11/24 0907     General    Vitals reviewed.  Constitutional: She is oriented to person, place, and time. She appears well-developed and well-nourished.   HENT:   Head: Normocephalic and atraumatic.   Eyes: EOM are normal. Pupils are equal, round, and reactive to light.   Neck: Neck supple.   Cardiovascular:  Normal rate, regular rhythm and normal heart sounds.            Pulmonary/Chest: Effort normal and breath sounds normal.   Abdominal: Soft. Bowel sounds are normal.   Neurological: She is alert and oriented to person, place, and time.   Psychiatric: She has a normal mood and affect. Her behavior is normal.             Right Hand/Wrist Exam     Tests   Phalens sign: positive  Tinel's sign (median nerve): positive  Carpal Tunnel Compression Test: positive      Other     Neuorologic Exam    Median Distribution: abnormal  Ulnar Distribution: normal  Radial Distribution: normal      Left Hand/Wrist Exam   Left hand exam is normal.          Vascular Exam       Capillary Refill  Right Hand: normal capillary refill  Right Christoph's Test: rapid refill           Significant Labs: All pertinent labs within the past 24 hours have been reviewed.    Significant Imaging: I  have reviewed all pertinent imaging results/findings.

## 2024-06-11 NOTE — ANESTHESIA PREPROCEDURE EVALUATION
06/11/2024  Felix Smyth is a 38 y.o., female.      Pre-op Assessment    I have reviewed the Patient Summary Reports.    I have reviewed the NPO Status.   I have reviewed the Medications.     Review of Systems         Anesthesia Plan  Type of Anesthesia, risks & benefits discussed:    Anesthesia Type: Regional  Intra-op Monitoring Plan: Standard ASA Monitors  Post Op Pain Control Plan: multimodal analgesia  Informed Consent: Informed consent signed with the Patient and all parties understand the risks and agree with anesthesia plan.  All questions answered.   ASA Score: 2    Ready For Surgery From Anesthesia Perspective.     .  NPO greater than 8 hours  Had a asthma attack during/after a mastectomy  Allergic to latex, bactrim and hydrocodone    HTN   Asthma  H/o hysterectomy    MP II; adequate ROM at neck    Plan is [place] block

## 2024-06-12 NOTE — ANESTHESIA POSTPROCEDURE EVALUATION
Anesthesia Post Evaluation    Patient: Felix [facility]    Procedure(s) Performed: Procedure(s) (LRB):  RELEASE, CARPAL TUNNEL (Right)    Final Anesthesia Type: general      Patient location during evaluation: PACU  Post-procedure vital signs: reviewed and stable  Pain management: adequate  Airway patency: patent    PONV status at discharge: No PONV  Anesthetic complications: no      Cardiovascular status: hemodynamically stable  Respiratory status: unassisted  Hydration status: euvolemic  Follow-up not needed.              Vitals Value Taken Time   /89 06/11/24 1546   Temp 36.5 °C (97.7 °F) 06/11/24 1441   Pulse 71 06/11/24 1549   Resp 18 06/11/24 1545   SpO2 99 % 06/11/24 1549   Vitals shown include unfiled device data.      Event Time   Out of Recovery 15:10:56         Pain/Dawit Score: Pain Rating Prior to Med Admin: 7 (6/11/2024  3:45 PM)  Dawit Score: 10 (6/11/2024  3:15 PM)

## 2024-06-24 ENCOUNTER — OFFICE VISIT (OUTPATIENT)
Dept: ORTHOPEDICS | Facility: CLINIC | Age: 39
End: 2024-06-24
Payer: MEDICAID

## 2024-06-24 DIAGNOSIS — Z98.890 STATUS POST CARPAL TUNNEL RELEASE: Primary | ICD-10-CM

## 2024-06-24 PROCEDURE — 99999 PR PBB SHADOW E&M-EST. PATIENT-LVL II: CPT | Mod: PBBFAC,,,

## 2024-06-24 PROCEDURE — 99212 OFFICE O/P EST SF 10 MIN: CPT | Mod: PBBFAC

## 2024-06-24 NOTE — PROGRESS NOTES
Release, Carpal Tunnel - Right 2024    Felix Smyth is a 38 y.o. female seen today for post-op visit.  Patient is approximately 2 weeks status post carpal tunnel release by Dr. Mcintosh.  Patient was doing well today no complaints.      PAST MEDICAL HISTORY:   Past Medical History:   Diagnosis Date    Depression 2024    Herpes simplex virus (HSV) infection     Hypertension     Mild intermittent asthma without complication 2022        PAST SURGICAL HISTORY:   Past Surgical History:   Procedure Laterality Date    CARPAL TUNNEL RELEASE Right 2024    Procedure: RELEASE, CARPAL TUNNEL;  Surgeon: Waldemar Mcintosh MD;  Location: [facility];  Service: Orthopedics;  Laterality: Right;     SECTION      HYSTERECTOMY      LAPAROSCOPIC SALPINGO-OOPHORECTOMY Left 2021    Procedure: SALPINGO-OOPHORECTOMY, LAPAROSCOPIC;  Surgeon: Maria Tomas MD;  Location: [facility];  Service: OB/GYN;  Laterality: Left;    LAPAROSCOPIC SURGICAL REMOVAL OF ECTOPIC PREGNANCY Right     LAPAROSCOPIC TOTAL HYSTERECTOMY Bilateral 2021    Procedure: HYSTERECTOMY, TOTAL, LAPAROSCOPIC;  Surgeon: Maria Tomas MD;  Location: [facility];  Service: OB/GYN;  Laterality: Bilateral;        MEDICATIONS:   Current Outpatient Medications:     albuterol (PROVENTIL) 2.5 mg /3 mL (0.083 %) nebulizer solution, Take 2.5 mg by nebulization every 6 (six) hours as needed for Wheezing., Disp: , Rfl:     albuterol (PROVENTIL/VENTOLIN HFA) 90 mcg/actuation inhaler, albuterol sulfate HFA 90 mcg/actuation aerosol inhaler  Inhale 1 puff every 4-6 hours by inhalation route as needed., Disp: 18 g, Rfl: 5    atenoloL-chlorthalidone (TENORETIC) 50-25 mg Tab, Take 1 tablet by mouth once daily., Disp: 90 tablet, Rfl: 1    budesonide-formoterol 160-4.5 mcg (SYMBICORT) 160-4.5 mcg/actuation HFAA, Inhale 2 puffs into the lungs 2 (two) times daily., Disp: , Rfl:     cyclobenzaprine  (FLEXERIL) 10 MG tablet, Take 10 mg by mouth 3 (three) times daily., Disp: , Rfl:     estradioL (ESTRACE) 0.5 MG tablet, Take 1 tablet (0.5 mg total) by mouth once daily., Disp: 30 tablet, Rfl: 11    FLUoxetine 20 MG capsule, Take 3 tablets by mouth daily, Disp: 270 capsule, Rfl: 1    ibuprofen (ADVIL,MOTRIN) 800 MG tablet, Take 1 tablet (800 mg total) by mouth 3 (three) times daily as needed for Pain., Disp: 90 tablet, Rfl: 1    triamcinolone acetonide 0.1% (KENALOG) 0.1 % cream, Apply topically 2 (two) times daily., Disp: 80 g, Rfl: 1    zolpidem (AMBIEN) 5 MG Tab, Take 1 tablet (5 mg total) by mouth every evening., Disp: 30 tablet, Rfl: 2       PHYSICAL EXAM:      GENERAL: Well-developed, well-nourished female . The patient is alert, oriented and cooperative.    EXTREMITIES:  Right wrist with skin clean dry and intact, good range of motion with flexion-extension, minimal swelling and erythema around incision site, neurovascularly intact    WOUND: Incisions are clean,dry,intact without signs of infection and healing well      RADIOGRAPHIC FINDINGS:   No results found.     Patient Active Problem List    Diagnosis Date Noted    Eczema 05/22/2024    Depression 05/22/2024    Primary osteoarthritis of left knee 01/23/2024    Carpal tunnel syndrome of right wrist 01/10/2024    Insomnia 12/27/2022    Vitamin D deficiency 12/27/2022    Vitamin B12 deficiency 12/27/2022    Essential hypertension 12/27/2022    Mild intermittent asthma without complication 12/07/2022    Polydipsia 08/25/2021    Menorrhagia with regular cycle 08/25/2021    Pelvic pain 08/25/2021     IMPRESSION AND PLAN: Patient is status-post Release, Carpal Tunnel - Right doing well.  All incisional sutures removed today and replaced with Steri-Strips, discussed that these can get wet in the shower in 2-3 days, let them fall off on their own.  Discussed gentle range-of-motion exercises, no lifting pushing or pulling greater than a gal of milk for 2 more  weeks.  Follow up with Dr. Mcintosh in 4 weeks, sooner as needed.      No follow-ups on file.       Jeannie Guidry PA-C      (Subject to voice recognition error, transcription service not allowed)

## 2024-08-22 ENCOUNTER — OFFICE VISIT (OUTPATIENT)
Dept: FAMILY MEDICINE | Facility: CLINIC | Age: 39
End: 2024-08-22
Payer: MEDICAID

## 2024-08-22 VITALS
TEMPERATURE: 99 F | BODY MASS INDEX: 38.51 KG/M2 | HEIGHT: 61 IN | RESPIRATION RATE: 18 BRPM | HEART RATE: 88 BPM | OXYGEN SATURATION: 98 % | DIASTOLIC BLOOD PRESSURE: 87 MMHG | SYSTOLIC BLOOD PRESSURE: 130 MMHG | WEIGHT: 204 LBS

## 2024-08-22 DIAGNOSIS — R51.9 NONINTRACTABLE HEADACHE, UNSPECIFIED CHRONICITY PATTERN, UNSPECIFIED HEADACHE TYPE: Primary | ICD-10-CM

## 2024-08-22 DIAGNOSIS — I10 PRIMARY HYPERTENSION: ICD-10-CM

## 2024-08-22 DIAGNOSIS — F32.89 OTHER DEPRESSION: ICD-10-CM

## 2024-08-22 DIAGNOSIS — Z79.899 LONG TERM USE OF DRUG: ICD-10-CM

## 2024-08-22 DIAGNOSIS — J45.20 MILD INTERMITTENT ASTHMA WITHOUT COMPLICATION: ICD-10-CM

## 2024-08-22 DIAGNOSIS — G47.00 INSOMNIA, UNSPECIFIED TYPE: ICD-10-CM

## 2024-08-22 LAB
CTP QC/QA: YES
CTP QC/QA: YES
MOLECULAR STREP A: NEGATIVE
POC (AMP) AMPHETAMINE: NEGATIVE
POC (BAR) BARBITURATES: NEGATIVE
POC (BUP) BUPRENORPHINE: NEGATIVE
POC (BZO) BENZODIAZEPINES: NEGATIVE
POC (COC) COCAINE: NEGATIVE
POC (MDMA) METHYLENEDIOXYMETHAMPHETAMINE 3,4: NEGATIVE
POC (MET) METHAMPHETAMINE: NEGATIVE
POC (MOP) OPIATES: NEGATIVE
POC (MTD) METHADONE: NEGATIVE
POC (OXY) OXYCODONE: NEGATIVE
POC (PCP) PHENCYCLIDINE: NEGATIVE
POC (TCA) TRICYCLIC ANTIDEPRESSANTS: NEGATIVE
POC TEMPERATURE (URINE): 92
POC THC: NEGATIVE

## 2024-08-22 PROCEDURE — 96372 THER/PROPH/DIAG INJ SC/IM: CPT | Mod: ,,, | Performed by: FAMILY MEDICINE

## 2024-08-22 PROCEDURE — 1159F MED LIST DOCD IN RCRD: CPT | Mod: CPTII,,, | Performed by: FAMILY MEDICINE

## 2024-08-22 PROCEDURE — 3008F BODY MASS INDEX DOCD: CPT | Mod: CPTII,,, | Performed by: FAMILY MEDICINE

## 2024-08-22 PROCEDURE — 80305 DRUG TEST PRSMV DIR OPT OBS: CPT | Mod: RHCUB | Performed by: FAMILY MEDICINE

## 2024-08-22 PROCEDURE — 3079F DIAST BP 80-89 MM HG: CPT | Mod: CPTII,,, | Performed by: FAMILY MEDICINE

## 2024-08-22 PROCEDURE — 99214 OFFICE O/P EST MOD 30 MIN: CPT | Mod: 25,,, | Performed by: FAMILY MEDICINE

## 2024-08-22 PROCEDURE — 1160F RVW MEDS BY RX/DR IN RCRD: CPT | Mod: CPTII,,, | Performed by: FAMILY MEDICINE

## 2024-08-22 PROCEDURE — 87651 STREP A DNA AMP PROBE: CPT | Mod: RHCUB | Performed by: FAMILY MEDICINE

## 2024-08-22 PROCEDURE — 3075F SYST BP GE 130 - 139MM HG: CPT | Mod: CPTII,,, | Performed by: FAMILY MEDICINE

## 2024-08-22 RX ORDER — FLUOXETINE HYDROCHLORIDE 20 MG/1
CAPSULE ORAL
Qty: 270 CAPSULE | Refills: 1 | Status: SHIPPED | OUTPATIENT
Start: 2024-08-22

## 2024-08-22 RX ORDER — ALBUTEROL SULFATE 90 UG/1
INHALANT RESPIRATORY (INHALATION)
Qty: 18 G | Refills: 5 | Status: SHIPPED | OUTPATIENT
Start: 2024-08-22

## 2024-08-22 RX ORDER — SUMATRIPTAN SUCCINATE 100 MG/1
TABLET ORAL
Qty: 9 TABLET | Refills: 5 | Status: SHIPPED | OUTPATIENT
Start: 2024-08-22

## 2024-08-22 RX ORDER — ATENOLOL AND CHLORTHALIDONE TABLET 50; 25 MG/1; MG/1
1 TABLET ORAL DAILY
Qty: 90 TABLET | Refills: 1 | Status: SHIPPED | OUTPATIENT
Start: 2024-08-22

## 2024-08-22 RX ORDER — BUDESONIDE AND FORMOTEROL FUMARATE DIHYDRATE 160; 4.5 UG/1; UG/1
2 AEROSOL RESPIRATORY (INHALATION) 2 TIMES DAILY
Qty: 6 G | Refills: 1 | Status: SHIPPED | OUTPATIENT
Start: 2024-08-22

## 2024-08-22 RX ORDER — ZOLPIDEM TARTRATE 5 MG/1
5 TABLET ORAL NIGHTLY
Qty: 30 TABLET | Refills: 2 | Status: SHIPPED | OUTPATIENT
Start: 2024-08-22

## 2024-08-22 RX ORDER — AZITHROMYCIN 250 MG/1
250 TABLET, FILM COATED ORAL DAILY
COMMUNITY
Start: 2024-08-12 | End: 2024-08-22

## 2024-08-22 RX ORDER — KETOROLAC TROMETHAMINE 30 MG/ML
60 INJECTION, SOLUTION INTRAMUSCULAR; INTRAVENOUS
Status: COMPLETED | OUTPATIENT
Start: 2024-08-22 | End: 2024-08-22

## 2024-08-22 RX ADMIN — KETOROLAC TROMETHAMINE 60 MG: 30 INJECTION, SOLUTION INTRAMUSCULAR; INTRAVENOUS at 04:08

## 2024-08-22 NOTE — PROGRESS NOTES
"New Clinic Note    Felix Smyth is a 38 y.o. female     CC:   Chief Complaint   Patient presents with    Insomnia     Patient is here for her 3 month follow up to get renewal on her Ambien for sleep.     Follow-up    Medication Refill    Headache     Complains of a bad headache. Stated it is so bad she thinks her nose might bleed. She took last Z-PACK on 08/18 for Strep. She was drinking a Big Gulp Soda  (quart size) soda when she arrived. Has her only child with her today. Stated she was "running behind" and late for her appointment today and just does not feel good.         Subjective    History of Present Illness HPI   Patient is for evaluation of chronic medical problems. Patient needs refills. Felix  is tolerating medications well without side effects.   Patient complains of a headache that started yesterday. She has taken Tylenol without relief. She was diagnosed with strep on the 13th and took a Zpak and received a penicillin injection.     Current Outpatient Medications:     albuterol (PROVENTIL) 2.5 mg /3 mL (0.083 %) nebulizer solution, Take 2.5 mg by nebulization every 6 (six) hours as needed for Wheezing., Disp: , Rfl:     cyclobenzaprine (FLEXERIL) 10 MG tablet, Take 10 mg by mouth 3 (three) times daily., Disp: , Rfl:     estradioL (ESTRACE) 0.5 MG tablet, Take 1 tablet (0.5 mg total) by mouth once daily., Disp: 30 tablet, Rfl: 11    ibuprofen (ADVIL,MOTRIN) 800 MG tablet, Take 1 tablet (800 mg total) by mouth 3 (three) times daily as needed for Pain., Disp: 90 tablet, Rfl: 1    triamcinolone acetonide 0.1% (KENALOG) 0.1 % cream, Apply topically 2 (two) times daily., Disp: 80 g, Rfl: 1    albuterol (PROVENTIL/VENTOLIN HFA) 90 mcg/actuation inhaler, albuterol sulfate HFA 90 mcg/actuation aerosol inhaler  Inhale 1 puff every 4-6 hours by inhalation route as needed., Disp: 18 g, Rfl: 5    atenoloL-chlorthalidone (TENORETIC) 50-25 mg Tab, Take 1 tablet by mouth once daily., Disp: 90 tablet, Rfl: " 1    budesonide-formoterol 160-4.5 mcg (SYMBICORT) 160-4.5 mcg/actuation HFAA, Inhale 2 puffs into the lungs 2 (two) times daily., Disp: 6 g, Rfl: 1    FLUoxetine 20 MG capsule, Take 3 tablets by mouth daily, Disp: 270 capsule, Rfl: 1    sumatriptan (IMITREX) 100 MG tablet, Take 1 tablet at beginning of headache. May repeat in 2 hours if needed. Max 2/24 hours., Disp: 9 tablet, Rfl: 5    zolpidem (AMBIEN) 5 MG Tab, Take 1 tablet (5 mg total) by mouth every evening., Disp: 30 tablet, Rfl: 2     Past Medical History:   Diagnosis Date    Depression 2024    Herpes simplex virus (HSV) infection     Hypertension     Mild intermittent asthma without complication 2022        Family History   Problem Relation Name Age of Onset    No Known Problems Father      Diabetes Mother      No Known Problems Son      No Known Problems Son      No Known Problems Daughter          Past Surgical History:   Procedure Laterality Date    CARPAL TUNNEL RELEASE Right 2024    Procedure: RELEASE, CARPAL TUNNEL;  Surgeon: Waldemar Mcintosh MD;  Location: [facility];  Service: Orthopedics;  Laterality: Right;     SECTION      HYSTERECTOMY      LAPAROSCOPIC SALPINGO-OOPHORECTOMY Left 2021    Procedure: SALPINGO-OOPHORECTOMY, LAPAROSCOPIC;  Surgeon: Maria Tomas MD;  Location: [facility];  Service: OB/GYN;  Laterality: Left;    LAPAROSCOPIC SURGICAL REMOVAL OF ECTOPIC PREGNANCY Right     LAPAROSCOPIC TOTAL HYSTERECTOMY Bilateral 2021    Procedure: HYSTERECTOMY, TOTAL, LAPAROSCOPIC;  Surgeon: Maria Tomas MD;  Location: [facility];  Service: OB/GYN;  Laterality: Bilateral;        Review of Systems   Constitutional:  Negative for fatigue and fever.   HENT:  Negative for ear pain, postnasal drip, rhinorrhea and sinus pressure/congestion.    Respiratory:  Negative for cough and shortness of breath.    Cardiovascular:  Negative for chest pain.   Gastrointestinal:  Negative for  "abdominal pain, diarrhea, nausea and vomiting.   Genitourinary:  Negative for dysuria.   Neurological:  Positive for headaches.        /87 (BP Location: Left arm, Patient Position: Sitting, BP Method: Large (Automatic))   Pulse 88   Temp 98.5 °F (36.9 °C) (Oral)   Resp 18   Ht 5' 1" (1.549 m)   Wt 92.5 kg (204 lb)   LMP  (LMP Unknown)   SpO2 98%   BMI 38.55 kg/m²      Physical Exam  HENT:      Head: Normocephalic and atraumatic.      Mouth/Throat:      Pharynx: Oropharynx is clear.   Cardiovascular:      Rate and Rhythm: Normal rate and regular rhythm.   Pulmonary:      Effort: Pulmonary effort is normal.      Breath sounds: Normal breath sounds.   Neurological:      Mental Status: She is alert and oriented to person, place, and time.   Psychiatric:         Mood and Affect: Mood normal.         Behavior: Behavior normal.          Assessment and Plan      ICD-10-CM ICD-9-CM   1. Nonintractable headache, unspecified chronicity pattern, unspecified headache type  R51.9 784.0   2. Long term use of drug  Z79.899 V58.69   3. Insomnia, unspecified type  G47.00 780.52   4. Primary hypertension  I10 401.9   5. Mild intermittent asthma without complication  J45.20 493.90   6. Other depression  F32.89 311        1. Nonintractable headache, unspecified chronicity pattern, unspecified headache type  Patient has a history of migraines when she was younger. Strep swab is negative. Toradol and imitrex for pain.   -     POCT Strep A, Molecular  -     ketorolac injection 60 mg  -     sumatriptan (IMITREX) 100 MG tablet; Take 1 tablet at beginning of headache. May repeat in 2 hours if needed. Max 2/24 hours.  Dispense: 9 tablet; Refill: 5    2. Long term use of drug   and UDS were reviewed and consistent.   -     POCT Urine Drug Screen Presump    3. Insomnia, unspecified type  The current medical regimen is effective;  continue present plan and medications.  -     POCT Urine Drug Screen Presump  -     zolpidem " (AMBIEN) 5 MG Tab; Take 1 tablet (5 mg total) by mouth every evening.  Dispense: 30 tablet; Refill: 2    4. Primary hypertension  The current medical regimen is effective;  continue present plan and medications.  -     atenoloL-chlorthalidone (TENORETIC) 50-25 mg Tab; Take 1 tablet by mouth once daily.  Dispense: 90 tablet; Refill: 1    5. Mild intermittent asthma without complication  The current medical regimen is effective;  continue present plan and medications.  -     albuterol (PROVENTIL/VENTOLIN HFA) 90 mcg/actuation inhaler; albuterol sulfate HFA 90 mcg/actuation aerosol inhaler   Inhale 1 puff every 4-6 hours by inhalation route as needed.  Dispense: 18 g; Refill: 5  -     budesonide-formoterol 160-4.5 mcg (SYMBICORT) 160-4.5 mcg/actuation HFAA; Inhale 2 puffs into the lungs 2 (two) times daily.  Dispense: 6 g; Refill: 1    6. Other depression  The current medical regimen is effective;  continue present plan and medications.  -     FLUoxetine 20 MG capsule; Take 3 tablets by mouth daily  Dispense: 270 capsule; Refill: 1        Follow up in about 3 months (around 11/22/2024).

## 2024-10-03 ENCOUNTER — OFFICE VISIT (OUTPATIENT)
Dept: FAMILY MEDICINE | Facility: CLINIC | Age: 39
End: 2024-10-03
Payer: MEDICAID

## 2024-10-03 VITALS
HEIGHT: 61 IN | HEART RATE: 79 BPM | RESPIRATION RATE: 18 BRPM | BODY MASS INDEX: 38.51 KG/M2 | SYSTOLIC BLOOD PRESSURE: 117 MMHG | TEMPERATURE: 97 F | OXYGEN SATURATION: 100 % | WEIGHT: 204 LBS | DIASTOLIC BLOOD PRESSURE: 76 MMHG

## 2024-10-03 DIAGNOSIS — G43.709 CHRONIC MIGRAINE WITHOUT AURA WITHOUT STATUS MIGRAINOSUS, NOT INTRACTABLE: Primary | ICD-10-CM

## 2024-10-03 DIAGNOSIS — R11.0 NAUSEA: ICD-10-CM

## 2024-10-03 DIAGNOSIS — Z12.31 ENCOUNTER FOR SCREENING MAMMOGRAM FOR MALIGNANT NEOPLASM OF BREAST: ICD-10-CM

## 2024-10-03 PROCEDURE — 1159F MED LIST DOCD IN RCRD: CPT | Mod: CPTII,,, | Performed by: FAMILY MEDICINE

## 2024-10-03 PROCEDURE — 1160F RVW MEDS BY RX/DR IN RCRD: CPT | Mod: CPTII,,, | Performed by: FAMILY MEDICINE

## 2024-10-03 PROCEDURE — 3078F DIAST BP <80 MM HG: CPT | Mod: CPTII,,, | Performed by: FAMILY MEDICINE

## 2024-10-03 PROCEDURE — 99214 OFFICE O/P EST MOD 30 MIN: CPT | Mod: 25,,, | Performed by: FAMILY MEDICINE

## 2024-10-03 PROCEDURE — 96372 THER/PROPH/DIAG INJ SC/IM: CPT | Mod: ,,, | Performed by: FAMILY MEDICINE

## 2024-10-03 PROCEDURE — 3074F SYST BP LT 130 MM HG: CPT | Mod: CPTII,,, | Performed by: FAMILY MEDICINE

## 2024-10-03 PROCEDURE — 3008F BODY MASS INDEX DOCD: CPT | Mod: CPTII,,, | Performed by: FAMILY MEDICINE

## 2024-10-03 RX ORDER — PROMETHAZINE HYDROCHLORIDE 25 MG/1
25 TABLET ORAL EVERY 6 HOURS PRN
Qty: 60 TABLET | Refills: 1 | Status: SHIPPED | OUTPATIENT
Start: 2024-10-03

## 2024-10-03 RX ORDER — KETOROLAC TROMETHAMINE 30 MG/ML
60 INJECTION, SOLUTION INTRAMUSCULAR; INTRAVENOUS
Status: COMPLETED | OUTPATIENT
Start: 2024-10-03 | End: 2024-10-03

## 2024-10-03 RX ORDER — TOPIRAMATE 25 MG/1
50 TABLET ORAL 2 TIMES DAILY
Qty: 120 TABLET | Refills: 5 | Status: SHIPPED | OUTPATIENT
Start: 2024-10-03 | End: 2025-10-03

## 2024-10-03 RX ADMIN — KETOROLAC TROMETHAMINE 60 MG: 30 INJECTION, SOLUTION INTRAMUSCULAR; INTRAVENOUS at 03:10

## 2024-10-03 NOTE — PROGRESS NOTES
New Clinic Note    Felix Smyth is a 38 y.o. female     CC:   Chief Complaint   Patient presents with    Headache     History of migraine headache. Feels nauseated and pain is 5/10. Has paper work needs signed from her workplace because she had to stop working on her computer due to her headaches. 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 Asking for an injection for her migraine.         Subjective    History of Present Illness HPI   Patient complains of migraines that are worsening. She reports that she is having over 15 migraines a month. She has taken imitrex with some relief. She complains of nausea with her headaches. She reports that the light from her computer are making her headaches worse.     Current Outpatient Medications:     albuterol (PROVENTIL/VENTOLIN HFA) 90 mcg/actuation inhaler, albuterol sulfate HFA 90 mcg/actuation aerosol inhaler  Inhale 1 puff every 4-6 hours by inhalation route as needed., Disp: 18 g, Rfl: 5    atenoloL-chlorthalidone (TENORETIC) 50-25 mg Tab, Take 1 tablet by mouth once daily., Disp: 90 tablet, Rfl: 1    budesonide-formoterol 160-4.5 mcg (SYMBICORT) 160-4.5 mcg/actuation HFAA, Inhale 2 puffs into the lungs 2 (two) times daily., Disp: 6 g, Rfl: 1    cyclobenzaprine (FLEXERIL) 10 MG tablet, Take 10 mg by mouth 3 (three) times daily., Disp: , Rfl:     estradioL (ESTRACE) 0.5 MG tablet, Take 1 tablet (0.5 mg total) by mouth once daily., Disp: 30 tablet, Rfl: 11    FLUoxetine 20 MG capsule, Take 3 tablets by mouth daily, Disp: 270 capsule, Rfl: 1    ibuprofen (ADVIL,MOTRIN) 800 MG tablet, Take 1 tablet (800 mg total) by mouth 3 (three) times daily as needed for Pain., Disp: 90 tablet, Rfl: 1    sumatriptan (IMITREX) 100 MG tablet, Take 1 tablet at beginning of headache. May repeat in 2 hours if needed. Max 2/24 hours., Disp: 9 tablet, Rfl: 5    triamcinolone acetonide 0.1% (KENALOG) 0.1 % cream, Apply topically 2 (two) times daily.,  Disp: 80 g, Rfl: 1    zolpidem (AMBIEN) 5 MG Tab, Take 1 tablet (5 mg total) by mouth every evening., Disp: 30 tablet, Rfl: 2    promethazine (PHENERGAN) 25 MG tablet, Take 1 tablet (25 mg total) by mouth every 6 (six) hours as needed for Nausea., Disp: 60 tablet, Rfl: 1    topiramate (TOPAMAX) 25 MG tablet, Take 2 tablets (50 mg total) by mouth 2 (two) times daily., Disp: 120 tablet, Rfl: 5     Past Medical History:   Diagnosis Date    Depression 2024    Herpes simplex virus (HSV) infection     Hypertension     Mild intermittent asthma without complication 2022        Family History   Problem Relation Name Age of Onset    No Known Problems Father      Diabetes Mother      No Known Problems Son      No Known Problems Son      No Known Problems Daughter          Past Surgical History:   Procedure Laterality Date    CARPAL TUNNEL RELEASE Right 2024    Procedure: RELEASE, CARPAL TUNNEL;  Surgeon: Waldemar Mcintosh MD;  Location: [facility];  Service: Orthopedics;  Laterality: Right;     SECTION      HYSTERECTOMY      LAPAROSCOPIC SALPINGO-OOPHORECTOMY Left 2021    Procedure: SALPINGO-OOPHORECTOMY, LAPAROSCOPIC;  Surgeon: Maria Tomas MD;  Location: [facility];  Service: OB/GYN;  Laterality: Left;    LAPAROSCOPIC SURGICAL REMOVAL OF ECTOPIC PREGNANCY Right     LAPAROSCOPIC TOTAL HYSTERECTOMY Bilateral 2021    Procedure: HYSTERECTOMY, TOTAL, LAPAROSCOPIC;  Surgeon: Maria Tomas MD;  Location: [facility];  Service: OB/GYN;  Laterality: Bilateral;        Review of Systems   Constitutional:  Negative for fatigue and fever.   HENT:  Negative for ear pain, postnasal drip, rhinorrhea and sinus pressure/congestion.    Respiratory:  Negative for cough and shortness of breath.    Cardiovascular:  Negative for chest pain.   Gastrointestinal:  Positive for nausea. Negative for abdominal pain, diarrhea and vomiting.   Genitourinary:  Negative for dysuria.  "  Neurological:  Positive for headaches.        /76 (BP Location: Left arm, Patient Position: Sitting)   Pulse 79   Temp 97.1 °F (36.2 °C) (Oral)   Resp 18   Ht 5' 1" (1.549 m)   Wt 92.5 kg (204 lb)   LMP  (LMP Unknown)   SpO2 100%   BMI 38.55 kg/m²      Physical Exam  HENT:      Head: Normocephalic and atraumatic.      Mouth/Throat:      Pharynx: Oropharynx is clear.   Cardiovascular:      Rate and Rhythm: Normal rate and regular rhythm.   Pulmonary:      Effort: Pulmonary effort is normal.      Breath sounds: Normal breath sounds.   Neurological:      Mental Status: She is alert and oriented to person, place, and time.   Psychiatric:         Mood and Affect: Mood normal.         Behavior: Behavior normal.          Assessment and Plan      ICD-10-CM ICD-9-CM   1. Chronic migraine without aura without status migrainosus, not intractable  G43.709 346.70   2. Nausea  R11.0 787.02   3. Encounter for screening mammogram for malignant neoplasm of breast  Z12.31 V76.12        1. Chronic migraine without aura without status migrainosus, not intractable  Not controlled. Will start topamax for prevention. Follow up in 1 month.   -     topiramate (TOPAMAX) 25 MG tablet; Take 2 tablets (50 mg total) by mouth 2 (two) times daily.  Dispense: 120 tablet; Refill: 5  -     ketorolac injection 60 mg    2. Nausea  Phenergan for nausea and migraines.   -     promethazine (PHENERGAN) 25 MG tablet; Take 1 tablet (25 mg total) by mouth every 6 (six) hours as needed for Nausea.  Dispense: 60 tablet; Refill: 1    3. Encounter for screening mammogram for malignant neoplasm of breast  -     Mammo Digital Screening Bil[facility] w/ [facility]; Future; Expected date: 10/03/2024      Follow up in about 4 weeks (around 10/31/2024).       "

## 2024-11-05 ENCOUNTER — OFFICE VISIT (OUTPATIENT)
Dept: OBSTETRICS AND GYNECOLOGY | Facility: CLINIC | Age: 39
End: 2024-11-05
Payer: MEDICAID

## 2024-11-05 VITALS
HEART RATE: 59 BPM | WEIGHT: 200.63 LBS | BODY MASS INDEX: 37.9 KG/M2 | DIASTOLIC BLOOD PRESSURE: 80 MMHG | SYSTOLIC BLOOD PRESSURE: 121 MMHG

## 2024-11-05 DIAGNOSIS — Z12.72 SPECIAL SCREENING FOR MALIGNANT NEOPLASMS, VAGINA: ICD-10-CM

## 2024-11-05 DIAGNOSIS — Z72.51 HIGH RISK SEXUAL BEHAVIOR, UNSPECIFIED TYPE: ICD-10-CM

## 2024-11-05 DIAGNOSIS — K46.9 ABDOMINAL HERNIA WITHOUT OBSTRUCTION AND WITHOUT GANGRENE, RECURRENCE NOT SPECIFIED, UNSPECIFIED HERNIA TYPE: ICD-10-CM

## 2024-11-05 DIAGNOSIS — Z11.4 ENCOUNTER FOR SCREENING FOR HIV: ICD-10-CM

## 2024-11-05 DIAGNOSIS — Z01.419 ROUTINE GYNECOLOGICAL EXAMINATION: Primary | ICD-10-CM

## 2024-11-05 DIAGNOSIS — Z11.3 SCREEN FOR STD (SEXUALLY TRANSMITTED DISEASE): ICD-10-CM

## 2024-11-05 LAB
CANDIDA SPECIES: POSITIVE
GARDNERELLA: POSITIVE
HAV IGM SER QL: NORMAL
HBV CORE IGM SER QL: NORMAL
HBV SURFACE AG SERPL QL IA: NORMAL
HCV AB SER QL: NORMAL
HIV 1+O+2 AB SERPL QL: NORMAL
SYPHILIS AB INTERPRETATION: NORMAL
TRICHOMONAS: NEGATIVE

## 2024-11-05 PROCEDURE — 87660 TRICHOMONAS VAGIN DIR PROBE: CPT | Mod: ,,, | Performed by: CLINICAL MEDICAL LABORATORY

## 2024-11-05 PROCEDURE — 3008F BODY MASS INDEX DOCD: CPT | Mod: CPTII,,, | Performed by: OBSTETRICS & GYNECOLOGY

## 2024-11-05 PROCEDURE — 87591 N.GONORRHOEAE DNA AMP PROB: CPT | Mod: ,,, | Performed by: CLINICAL MEDICAL LABORATORY

## 2024-11-05 PROCEDURE — 88142 CYTOPATH C/V THIN LAYER: CPT | Mod: TC,GCY | Performed by: OBSTETRICS & GYNECOLOGY

## 2024-11-05 PROCEDURE — 80074 ACUTE HEPATITIS PANEL: CPT | Mod: ,,, | Performed by: CLINICAL MEDICAL LABORATORY

## 2024-11-05 PROCEDURE — 87389 HIV-1 AG W/HIV-1&-2 AB AG IA: CPT | Mod: ,,, | Performed by: CLINICAL MEDICAL LABORATORY

## 2024-11-05 PROCEDURE — 87480 CANDIDA DNA DIR PROBE: CPT | Mod: ,,, | Performed by: CLINICAL MEDICAL LABORATORY

## 2024-11-05 PROCEDURE — 36415 COLL VENOUS BLD VENIPUNCTURE: CPT | Mod: ,,, | Performed by: OBSTETRICS & GYNECOLOGY

## 2024-11-05 PROCEDURE — 3074F SYST BP LT 130 MM HG: CPT | Mod: CPTII,,, | Performed by: OBSTETRICS & GYNECOLOGY

## 2024-11-05 PROCEDURE — 86780 TREPONEMA PALLIDUM: CPT | Mod: ,,, | Performed by: CLINICAL MEDICAL LABORATORY

## 2024-11-05 PROCEDURE — 87510 GARDNER VAG DNA DIR PROBE: CPT | Mod: ,,, | Performed by: CLINICAL MEDICAL LABORATORY

## 2024-11-05 PROCEDURE — 87624 HPV HI-RISK TYP POOLED RSLT: CPT | Mod: ,,, | Performed by: CLINICAL MEDICAL LABORATORY

## 2024-11-05 PROCEDURE — 87491 CHLMYD TRACH DNA AMP PROBE: CPT | Mod: ,,, | Performed by: CLINICAL MEDICAL LABORATORY

## 2024-11-05 PROCEDURE — 3079F DIAST BP 80-89 MM HG: CPT | Mod: CPTII,,, | Performed by: OBSTETRICS & GYNECOLOGY

## 2024-11-05 PROCEDURE — 99396 PREV VISIT EST AGE 40-64: CPT | Mod: ,,, | Performed by: OBSTETRICS & GYNECOLOGY

## 2024-11-05 NOTE — PROGRESS NOTES
CC: Well woman exam    Felix Smyth is a 39 y.o. female  presents for well woman exam.    LMP: No LMP recorded (lmp unknown). Patient has had a hysterectomy..    Last mammogram: N/A  Last Colonoscopy: N/A    Patient complains of hernia.     Past Medical History:   Diagnosis Date    Depression 2024    Herpes simplex virus (HSV) infection     Hypertension     Mild intermittent asthma without complication 2022     Past Surgical History:   Procedure Laterality Date    CARPAL TUNNEL RELEASE Right 2024    Procedure: RELEASE, CARPAL TUNNEL;  Surgeon: Waldemar Mcintosh MD;  Location: [facility] OR;  Service: Orthopedics;  Laterality: Right;     SECTION      HYSTERECTOMY      LAPAROSCOPIC SALPINGO-OOPHORECTOMY Left 2021    Procedure: SALPINGO-OOPHORECTOMY, LAPAROSCOPIC;  Surgeon: Maria Tomas MD;  Location: [facility] OR;  Service: OB/GYN;  Laterality: Left;    LAPAROSCOPIC SURGICAL REMOVAL OF ECTOPIC PREGNANCY Right     LAPAROSCOPIC TOTAL HYSTERECTOMY Bilateral 2021    Procedure: HYSTERECTOMY, TOTAL, LAPAROSCOPIC;  Surgeon: Maria Tomas MD;  Location: [facility], [place];  Service: OB/GYN;  Laterality: Bilateral;     Social History     Socioeconomic History    Marital status: Legally    Tobacco Use    Smoking status: Never     Passive exposure: Never    Smokeless tobacco: Never   Substance and Sexual Activity    Alcohol use: Yes     Comment: Socially    Drug use: Never    Sexual activity: Yes     Partners: Male     Birth control/protection: None     Family History   Problem Relation Name Age of Onset    No Known Problems Father      Diabetes Mother      No Known Problems Son      No Known Problems Son      No Known Problems Daughter       OB History          7    Para   4    Term   2       2    AB   3    Living   3         SAB   2    IAB        Ectopic   1    Multiple        Live Births   3                 /80   Pulse (!)  59   Wt 91 kg (200 lb 9.6 oz)   LMP  (LMP Unknown)   BMI 37.90 kg/m²       ROS:  GENERAL: Denies weight gain or weight loss. Feeling well overall.   SKIN: Denies rash or lesions.   HEAD: Denies head injury or headache.   NODES: Denies enlarged lymph nodes.   CHEST: Denies chest pain or shortness of breath.   CARDIOVASCULAR: Denies palpitations or left sided chest pain.   ABDOMEN: No abdominal pain, constipation, diarrhea, nausea, vomiting or rectal bleeding.   URINARY: No frequency, dysuria, hematuria, or burning on urination.  REPRODUCTIVE: See HPI.   BREASTS: The patient performs breast self-examination and denies pain, lumps, or nipple discharge.   HEMATOLOGIC: No easy bruisability or excessive bleeding.   MUSCULOSKELETAL: Denies joint pain or swelling.   NEUROLOGIC: Denies syncope or weakness.   PSYCHIATRIC: Denies depression, anxiety or mood swings.    PHYSICAL EXAM:  APPEARANCE: Well nourished, well developed, in no acute distress.  AFFECT: WNL, alert and oriented x 3  SKIN: No acne or hirsutism  NECK: Neck symmetric without masses or thyromegaly  NODES: No inguinal, cervical, axillary, or femoral lymph node enlargement  CHEST: Good respiratory effect  ABDOMEN: Soft.  No tenderness or masses.  No hepatosplenomegaly.  No hernias.  BREASTS: Symmetrical, no skin changes or visible lesions.  No palpable masses, nipple discharge bilaterally.  PELVIC: Normal external genitalia without lesions.  Normal hair distribution.  Adequate perineal body, normal urethral meatus.  Vagina moist and well rugated without lesions or discharge.  Cervix  and uterus surgically absent. Adnexa without masses or tenderness.    EXTREMITIES: No edema.    Routine gynecological examination  -     ThinPrep Pap Test; Future; Expected date: 11/05/2024  -     HPV DNA probe, amplified    Special screening for malignant neoplasms, vagina  -     ThinPrep Pap Test; Future; Expected date: 11/05/2024  -     HPV DNA probe, amplified    Screen for  STD (sexually transmitted disease)  -     Bacterial Vaginosis; Future; Expected date: 11/05/2024  -     Chlamydia/GC, PCR; Future; Expected date: 11/05/2024  -     Treponema Pallidum (Syphillis) Antibody; Future; Expected date: 11/05/2024  -     HIV 1/2 Ag/Ab (4th Gen); Future; Expected date: 11/05/2024  -     Hepatitis Panel, Acute; Future; Expected date: 11/05/2024    High risk sexual behavior, unspecified type  -     Bacterial Vaginosis; Future; Expected date: 11/05/2024  -     Chlamydia/GC, PCR; Future; Expected date: 11/05/2024  -     Treponema Pallidum (Syphillis) Antibody; Future; Expected date: 11/05/2024  -     HIV 1/2 Ag/Ab (4th Gen); Future; Expected date: 11/05/2024  -     Hepatitis Panel, Acute; Future; Expected date: 11/05/2024    Encounter for screening for HIV  -     HIV 1/2 Ag/Ab (4th Gen); Future; Expected date: 11/05/2024    Abdominal hernia without obstruction and without gangrene, recurrence not specified, unspecified hernia type  -     Ambulatory referral/consult to General Surgery; Future; Expected date: 11/12/2024            Patient was counseled today on A.C.S. Pap guidelines and recommendations for yearly pelvic exams, mammograms and monthly self breast exams; to see her PCP for other health maintenance.   Exercise regimen encouraged  Healthy food choices encouraged  Questions answered to desired level of satisfaction  Verbalized understanding to all information and instructions    Follow up in about 1 year (around 11/5/2025) for Annual.      Maria Tomas M.D., FCOG    OB/GYN

## 2024-11-06 ENCOUNTER — OFFICE VISIT (OUTPATIENT)
Dept: SURGERY | Facility: CLINIC | Age: 39
End: 2024-11-06
Attending: SURGERY
Payer: MEDICAID

## 2024-11-06 ENCOUNTER — OFFICE VISIT (OUTPATIENT)
Dept: FAMILY MEDICINE | Facility: CLINIC | Age: 39
End: 2024-11-06
Payer: MEDICAID

## 2024-11-06 VITALS
OXYGEN SATURATION: 96 % | BODY MASS INDEX: 39.46 KG/M2 | RESPIRATION RATE: 18 BRPM | TEMPERATURE: 100 F | SYSTOLIC BLOOD PRESSURE: 98 MMHG | HEIGHT: 60 IN | WEIGHT: 201 LBS | HEART RATE: 78 BPM | DIASTOLIC BLOOD PRESSURE: 68 MMHG

## 2024-11-06 VITALS — HEIGHT: 60 IN | WEIGHT: 200 LBS | BODY MASS INDEX: 39.27 KG/M2

## 2024-11-06 DIAGNOSIS — Z79.899 LONG TERM USE OF DRUG: ICD-10-CM

## 2024-11-06 DIAGNOSIS — R10.9 ABDOMINAL PAIN, UNSPECIFIED ABDOMINAL LOCATION: ICD-10-CM

## 2024-11-06 DIAGNOSIS — G89.29 CHRONIC PAIN OF LEFT KNEE: ICD-10-CM

## 2024-11-06 DIAGNOSIS — G43.709 CHRONIC MIGRAINE WITHOUT AURA WITHOUT STATUS MIGRAINOSUS, NOT INTRACTABLE: ICD-10-CM

## 2024-11-06 DIAGNOSIS — G47.00 INSOMNIA, UNSPECIFIED TYPE: Primary | ICD-10-CM

## 2024-11-06 DIAGNOSIS — K46.9 ABDOMINAL HERNIA WITHOUT OBSTRUCTION AND WITHOUT GANGRENE, RECURRENCE NOT SPECIFIED, UNSPECIFIED HERNIA TYPE: Primary | ICD-10-CM

## 2024-11-06 DIAGNOSIS — M25.562 CHRONIC PAIN OF LEFT KNEE: ICD-10-CM

## 2024-11-06 LAB
CHLAMYDIA BY PCR: NEGATIVE
CTP QC/QA: YES
N. GONORRHOEAE (GC) BY PCR: NEGATIVE
POC (AMP) AMPHETAMINE: NEGATIVE
POC (BAR) BARBITURATES: NEGATIVE
POC (BUP) BUPRENORPHINE: NEGATIVE
POC (BZO) BENZODIAZEPINES: NEGATIVE
POC (COC) COCAINE: NEGATIVE
POC (MDMA) METHYLENEDIOXYMETHAMPHETAMINE 3,4: NEGATIVE
POC (MET) METHAMPHETAMINE: NEGATIVE
POC (MOP) OPIATES: NEGATIVE
POC (MTD) METHADONE: NEGATIVE
POC (OXY) OXYCODONE: NEGATIVE
POC (PCP) PHENCYCLIDINE: NEGATIVE
POC (TCA) TRICYCLIC ANTIDEPRESSANTS: NEGATIVE
POC TEMPERATURE (URINE): 92
POC THC: NEGATIVE

## 2024-11-06 PROCEDURE — 3074F SYST BP LT 130 MM HG: CPT | Mod: CPTII,,, | Performed by: FAMILY MEDICINE

## 2024-11-06 PROCEDURE — 1159F MED LIST DOCD IN RCRD: CPT | Mod: CPTII,,, | Performed by: FAMILY MEDICINE

## 2024-11-06 PROCEDURE — 3078F DIAST BP <80 MM HG: CPT | Mod: CPTII,,, | Performed by: FAMILY MEDICINE

## 2024-11-06 PROCEDURE — 99214 OFFICE O/P EST MOD 30 MIN: CPT | Mod: ,,, | Performed by: FAMILY MEDICINE

## 2024-11-06 PROCEDURE — 1160F RVW MEDS BY RX/DR IN RCRD: CPT | Mod: CPTII,,, | Performed by: FAMILY MEDICINE

## 2024-11-06 PROCEDURE — 99999 PR PBB SHADOW E&M-EST. PATIENT-LVL IV: CPT | Mod: PBBFAC,,, | Performed by: SURGERY

## 2024-11-06 PROCEDURE — 3008F BODY MASS INDEX DOCD: CPT | Mod: CPTII,,, | Performed by: SURGERY

## 2024-11-06 PROCEDURE — 1159F MED LIST DOCD IN RCRD: CPT | Mod: CPTII,,, | Performed by: SURGERY

## 2024-11-06 PROCEDURE — 3008F BODY MASS INDEX DOCD: CPT | Mod: CPTII,,, | Performed by: FAMILY MEDICINE

## 2024-11-06 PROCEDURE — 80305 DRUG TEST PRSMV DIR OPT OBS: CPT | Mod: RHCUB | Performed by: FAMILY MEDICINE

## 2024-11-06 PROCEDURE — 99214 OFFICE O/P EST MOD 30 MIN: CPT | Mod: PBBFAC | Performed by: SURGERY

## 2024-11-06 PROCEDURE — 99205 OFFICE O/P NEW HI 60 MIN: CPT | Mod: S$PBB,,, | Performed by: SURGERY

## 2024-11-06 RX ORDER — IBUPROFEN 800 MG/1
800 TABLET ORAL 3 TIMES DAILY PRN
Qty: 90 TABLET | Refills: 1 | Status: SHIPPED | OUTPATIENT
Start: 2024-11-06

## 2024-11-06 RX ORDER — TOPIRAMATE 100 MG/1
100 TABLET, FILM COATED ORAL 2 TIMES DAILY
Qty: 60 TABLET | Refills: 5 | Status: SHIPPED | OUTPATIENT
Start: 2024-11-06 | End: 2025-11-06

## 2024-11-06 RX ORDER — ZOLPIDEM TARTRATE 5 MG/1
5 TABLET ORAL NIGHTLY
Qty: 30 TABLET | Refills: 2 | Status: SHIPPED | OUTPATIENT
Start: 2024-11-06

## 2024-11-06 NOTE — ASSESSMENT & PLAN NOTE
Exam not definitive for hernia.    We will obtain CT scan to evaluate for presence, size, and number of fascial defects.

## 2024-11-06 NOTE — PROGRESS NOTES
New Clinic Note    Felix Smyth is a 38 y.o. female     CC:   Chief Complaint   Patient presents with    Migraine     Patient is here for a one month follow up on migraine headaches. She was started on Topamax at last visit. Patient states she stil gets a headache but they are not as bad.     Insomnia     Patient needs a refill on Ambien.         Subjective    History of Present Illness HPI   Patient here to follow up on her migraines. She is tolerating topamax. She says her migraines are better. They are not as frequent and they are not as strong. She needs her medications refilled.     Current Outpatient Medications:     albuterol (PROVENTIL/VENTOLIN HFA) 90 mcg/actuation inhaler, albuterol sulfate HFA 90 mcg/actuation aerosol inhaler  Inhale 1 puff every 4-6 hours by inhalation route as needed., Disp: 18 g, Rfl: 5    atenoloL-chlorthalidone (TENORETIC) 50-25 mg Tab, Take 1 tablet by mouth once daily., Disp: 90 tablet, Rfl: 1    budesonide-formoterol 160-4.5 mcg (SYMBICORT) 160-4.5 mcg/actuation HFAA, Inhale 2 puffs into the lungs 2 (two) times daily., Disp: 6 g, Rfl: 1    cyclobenzaprine (FLEXERIL) 10 MG tablet, Take 10 mg by mouth 3 (three) times daily., Disp: , Rfl:     FLUoxetine 20 MG capsule, Take 3 tablets by mouth daily, Disp: 270 capsule, Rfl: 1    promethazine (PHENERGAN) 25 MG tablet, Take 1 tablet (25 mg total) by mouth every 6 (six) hours as needed for Nausea., Disp: 60 tablet, Rfl: 1    sumatriptan (IMITREX) 100 MG tablet, Take 1 tablet at beginning of headache. May repeat in 2 hours if needed. Max 2/24 hours., Disp: 9 tablet, Rfl: 5    triamcinolone acetonide 0.1% (KENALOG) 0.1 % cream, Apply topically 2 (two) times daily., Disp: 80 g, Rfl: 1    ibuprofen (ADVIL,MOTRIN) 800 MG tablet, Take 1 tablet (800 mg total) by mouth 3 (three) times daily as needed for Pain., Disp: 90 tablet, Rfl: 1    topiramate (TOPAMAX) 100 MG tablet, Take 1 tablet (100 mg total) by mouth 2 (two) times daily., Disp:  60 tablet, Rfl: 5    zolpidem (AMBIEN) 5 MG Tab, Take 1 tablet (5 mg total) by mouth every evening., Disp: 30 tablet, Rfl: 2     Past Medical History:   Diagnosis Date    Depression 2024    Herpes simplex virus (HSV) infection     Hypertension     Mild intermittent asthma without complication 2022        Family History   Problem Relation Name Age of Onset    No Known Problems Father      Diabetes Mother      No Known Problems Son      No Known Problems Son      No Known Problems Daughter          Past Surgical History:   Procedure Laterality Date    CARPAL TUNNEL RELEASE Right 2024    Procedure: RELEASE, CARPAL TUNNEL;  Surgeon: Waldemar Mcintosh MD;  Location: [facility] OR;  Service: Orthopedics;  Laterality: Right;     SECTION      HYSTERECTOMY      LAPAROSCOPIC SALPINGO-OOPHORECTOMY Left 2021    Procedure: SALPINGO-OOPHORECTOMY, LAPAROSCOPIC;  Surgeon: Maria Tomas MD;  Location: [facility] OR;  Service: OB/GYN;  Laterality: Left;    LAPAROSCOPIC SURGICAL REMOVAL OF ECTOPIC PREGNANCY Right     LAPAROSCOPIC TOTAL HYSTERECTOMY Bilateral 2021    Procedure: HYSTERECTOMY, TOTAL, LAPAROSCOPIC;  Surgeon: Maria Tomas MD;  Location: [facility] OR;  Service: OB/GYN;  Laterality: Bilateral;        Review of Systems   Constitutional:  Negative for fatigue and fever.   HENT:  Negative for ear pain, postnasal drip, rhinorrhea and sinus pressure/congestion.    Respiratory:  Negative for cough and shortness of breath.    Cardiovascular:  Negative for chest pain.   Gastrointestinal:  Negative for abdominal pain, diarrhea, nausea and vomiting.   Genitourinary:  Negative for dysuria.   Neurological:  Negative for headaches.        BP 98/68 (BP Location: Left arm)   Pulse 78   Temp (!) 100.4 °F (38 °C) (Oral)   Resp 18   Ht 5' (1.524 m)   Wt 91.2 kg (201 lb)   LMP  (LMP Unknown)   SpO2 96%   BMI 39.26 kg/m²      Physical Exam  HENT:      Head: Normocephalic and  atraumatic.   Cardiovascular:      Rate and Rhythm: Normal rate and regular rhythm.   Pulmonary:      Effort: Pulmonary effort is normal.      Breath sounds: Normal breath sounds.   Neurological:      Mental Status: She is alert and oriented to person, place, and time.   Psychiatric:         Mood and Affect: Mood normal.         Behavior: Behavior normal.          Assessment and Plan      ICD-10-CM ICD-9-CM   1. Insomnia, unspecified type  G47.00 780.52   2. Long term use of drug  Z79.899 V58.69   3. Chronic pain of left knee  M25.562 719.46    G89.29 338.29   4. Chronic migraine without aura without status migrainosus, not intractable  G43.709 346.70        1. Insomnia, unspecified type  The current medical regimen is effective;  continue present plan and medications.  -     POCT Urine Drug Screen Presump  -     zolpidem (AMBIEN) 5 MG Tab; Take 1 tablet (5 mg total) by mouth every evening.  Dispense: 30 tablet; Refill: 2    2. Long term use of drug   and UDS were reviewed and consistent.   -     POCT Urine Drug Screen Presump    3. Chronic pain of left knee  The current medical regimen is effective;  continue present plan and medications.  -     ibuprofen (ADVIL,MOTRIN) 800 MG tablet; Take 1 tablet (800 mg total) by mouth 3 (three) times daily as needed for Pain.  Dispense: 90 tablet; Refill: 1    4. Chronic migraine without aura without status migrainosus, not intractable  Not controlled but has improved. Increase topamax to 100mg.   -     topiramate (TOPAMAX) 100 MG tablet; Take 1 tablet (100 mg total) by mouth 2 (two) times daily.  Dispense: 60 tablet; Refill: 5          Follow up in about 3 months (around 2/6/2025).

## 2024-11-06 NOTE — PROGRESS NOTES
Subjective:           Patient ID: Felix Smyth is a 38 y.o. female.    Chief Complaint: Hernia (Abdominal hernia)      38-year-old female patient presents with a history of bulge and tenderness in the lower midline of the abdomen.  The patient reports that she has been noticing this for a few weeks.  She reports both tenderness and pain when changing positions.  She has not had any imaging, but was diagnosed with a hernia by primary care provider and referred for potential surgical management.        family history includes Diabetes in her mother; No Known Problems in her daughter, father, son, and son.    Past Medical History:   Diagnosis Date    Depression 05/22/2024    Herpes simplex virus (HSV) infection     Hypertension     Mild intermittent asthma without complication 12/07/2022        Current Outpatient Medications on File Prior to Visit   Medication Sig Dispense Refill    albuterol (PROVENTIL/VENTOLIN HFA) 90 mcg/actuation inhaler albuterol sulfate HFA 90 mcg/actuation aerosol inhaler   Inhale 1 puff every 4-6 hours by inhalation route as needed. 18 g 5    atenoloL-chlorthalidone (TENORETIC) 50-25 mg Tab Take 1 tablet by mouth once daily. 90 tablet 1    budesonide-formoterol 160-4.5 mcg (SYMBICORT) 160-4.5 mcg/actuation HFAA Inhale 2 puffs into the lungs 2 (two) times daily. 6 g 1    cyclobenzaprine (FLEXERIL) 10 MG tablet Take 10 mg by mouth 3 (three) times daily.      FLUoxetine 20 MG capsule Take 3 tablets by mouth daily 270 capsule 1    ibuprofen (ADVIL,MOTRIN) 800 MG tablet Take 1 tablet (800 mg total) by mouth 3 (three) times daily as needed for Pain. 90 tablet 1    promethazine (PHENERGAN) 25 MG tablet Take 1 tablet (25 mg total) by mouth every 6 (six) hours as needed for Nausea. 60 tablet 1    sumatriptan (IMITREX) 100 MG tablet Take 1 tablet at beginning of headache. May repeat in 2 hours if needed. Max 2/24 hours. 9 tablet 5    topiramate (TOPAMAX) 25 MG tablet Take 2 tablets (50 mg total)  by mouth 2 (two) times daily. 120 tablet 5    triamcinolone acetonide 0.1% (KENALOG) 0.1 % cream Apply topically 2 (two) times daily. 80 g 1    zolpidem (AMBIEN) 5 MG Tab Take 1 tablet (5 mg total) by mouth every evening. 30 tablet 2    estradioL (ESTRACE) 0.5 MG tablet Take 1 tablet (0.5 mg total) by mouth once daily. 30 tablet 11     No current facility-administered medications on file prior to visit.       Review of patient's allergies indicates:   Allergen Reactions    Bactrim [sulfamethoxazole-trimethoprim] Hives    Hydrocodone Itching       Past Surgical History:   Procedure Laterality Date    CARPAL TUNNEL RELEASE Right 2024    Procedure: RELEASE, CARPAL TUNNEL;  Surgeon: Waldemar Mcintosh MD;  Location: [facility] OR;  Service: Orthopedics;  Laterality: Right;     SECTION      HYSTERECTOMY      LAPAROSCOPIC SALPINGO-OOPHORECTOMY Left 2021    Procedure: SALPINGO-OOPHORECTOMY, LAPAROSCOPIC;  Surgeon: Maria Tomas MD;  Location: [facility] OR;  Service: OB/GYN;  Laterality: Left;    LAPAROSCOPIC SURGICAL REMOVAL OF ECTOPIC PREGNANCY Right     LAPAROSCOPIC TOTAL HYSTERECTOMY Bilateral 2021    Procedure: HYSTERECTOMY, TOTAL, LAPAROSCOPIC;  Surgeon: Maria Tomas MD;  Location: [facility];  Service: OB/GYN;  Laterality: Bilateral;         reports that she has never smoked. She has never been exposed to tobacco smoke. She has never used smokeless tobacco. She reports current alcohol use. She reports that she does not use drugs.       Review of Systems   All other systems reviewed and are negative.             Objective:      Ht 5' (1.524 m)   Wt 90.7 kg (200 lb)   LMP  (LMP Unknown)   BMI 39.06 kg/m²    Physical Exam  Cardiovascular:      Rate and Rhythm: Normal rate and regular rhythm.      Heart sounds: Normal heart sounds.   Pulmonary:      Breath sounds: Normal breath sounds.   Abdominal:      Palpations: Abdomen is soft.      Hernia: No hernia (Vague  fullness to the left upper aspect of lower midline incision, slightly lateral and caudad to umbilicus without a definitely palpable fascial defect) is present.   Musculoskeletal:         General: No swelling.   Skin:     Coloration: Skin is not jaundiced.   Neurological:      General: No focal deficit present.      Mental Status: She is alert.   Psychiatric:         Mood and Affect: Mood normal.           Assessment/Plan:     Problem List Items Addressed This Visit          GI    Abdominal hernia without obstruction and without gangrene - Primary    Overview     Had previous incisional hernia repair performed in [place], 2017         Current Assessment & Plan     Exam not definitive for hernia.    We will obtain CT scan to evaluate for presence, size, and number of fascial defects.         Relevant Orders    CT Abdomen Pelvis With IV Contrast Routine Oral Contrast     Other Visit Diagnoses       Abdominal pain, unspecified abdominal location        Relevant Orders    CT Abdomen Pelvis With IV Contrast Routine Oral Contrast

## 2024-11-07 ENCOUNTER — PATIENT MESSAGE (OUTPATIENT)
Dept: SURGERY | Facility: CLINIC | Age: 39
End: 2024-11-07
Payer: MEDICAID

## 2024-11-08 LAB
GH SERPL-MCNC: NORMAL NG/ML
HPV 16: NEGATIVE
HPV 18: NEGATIVE
HPV OTHER: NEGATIVE
INSULIN SERPL-ACNC: NORMAL U[IU]/ML
LAB AP CLINICAL INFORMATION: NORMAL
LAB AP GYN INTERPRETATION: NEGATIVE
LAB AP PAP DISCLAIMER COMMENTS: NORMAL
RENIN PLAS-CCNC: NORMAL NG/ML/H

## 2024-11-14 ENCOUNTER — HOSPITAL ENCOUNTER (OUTPATIENT)
Dept: RADIOLOGY | Facility: HOSPITAL | Age: 39
Discharge: HOME | End: 2024-11-14
Attending: SURGERY
Payer: MEDICAID

## 2024-11-14 ENCOUNTER — PATIENT MESSAGE (OUTPATIENT)
Dept: SURGERY | Facility: CLINIC | Age: 39
End: 2024-11-14
Payer: MEDICAID

## 2024-11-14 DIAGNOSIS — R10.9 ABDOMINAL PAIN, UNSPECIFIED ABDOMINAL LOCATION: ICD-10-CM

## 2024-11-14 DIAGNOSIS — K46.9 ABDOMINAL HERNIA WITHOUT OBSTRUCTION AND WITHOUT GANGRENE, RECURRENCE NOT SPECIFIED, UNSPECIFIED HERNIA TYPE: ICD-10-CM

## 2024-11-14 PROCEDURE — 74177 CT ABD & PELVIS W/CONTRAST: CPT | Mod: 26,,, | Performed by: RADIOLOGY

## 2024-11-14 PROCEDURE — 25500020 PHARM REV CODE 255: Performed by: SURGERY

## 2024-11-14 PROCEDURE — 74177 CT ABD & PELVIS W/CONTRAST: CPT | Mod: TC

## 2024-11-14 RX ORDER — FLUCONAZOLE 150 MG/1
150 TABLET ORAL DAILY
Qty: 1 TABLET | Refills: 0 | Status: SHIPPED | OUTPATIENT
Start: 2024-11-14 | End: 2024-11-15

## 2024-11-14 RX ORDER — METRONIDAZOLE 500 MG/1
500 TABLET ORAL EVERY 12 HOURS
Qty: 14 TABLET | Refills: 0 | Status: SHIPPED | OUTPATIENT
Start: 2024-11-14 | End: 2024-11-21

## 2024-11-14 RX ORDER — IOPAMIDOL 755 MG/ML
100 INJECTION, SOLUTION INTRAVASCULAR
Status: COMPLETED | OUTPATIENT
Start: 2024-11-14 | End: 2024-11-14

## 2024-11-14 RX ADMIN — IOPAMIDOL 100 ML: 755 INJECTION, SOLUTION INTRAVENOUS at 01:11

## 2024-11-20 ENCOUNTER — OFFICE VISIT (OUTPATIENT)
Dept: SURGERY | Facility: CLINIC | Age: 39
End: 2024-11-20
Attending: SURGERY
Payer: MEDICAID

## 2024-11-20 VITALS — BODY MASS INDEX: 39.39 KG/M2 | HEIGHT: 60 IN | WEIGHT: 200.63 LBS

## 2024-11-20 DIAGNOSIS — R10.9 ABDOMINAL PAIN, UNSPECIFIED ABDOMINAL LOCATION: ICD-10-CM

## 2024-11-20 DIAGNOSIS — Z09 POSTOP CHECK: ICD-10-CM

## 2024-11-20 PROCEDURE — 1159F MED LIST DOCD IN RCRD: CPT | Mod: CPTII,,, | Performed by: SURGERY

## 2024-11-20 PROCEDURE — 1160F RVW MEDS BY RX/DR IN RCRD: CPT | Mod: CPTII,,, | Performed by: SURGERY

## 2024-11-20 PROCEDURE — 99213 OFFICE O/P EST LOW 20 MIN: CPT | Mod: PBBFAC | Performed by: SURGERY

## 2024-11-20 PROCEDURE — 99999 PR PBB SHADOW E&M-EST. PATIENT-LVL III: CPT | Mod: PBBFAC,,, | Performed by: SURGERY

## 2024-11-20 PROCEDURE — 99214 OFFICE O/P EST MOD 30 MIN: CPT | Mod: S$PBB,,, | Performed by: SURGERY

## 2024-11-20 PROCEDURE — 3008F BODY MASS INDEX DOCD: CPT | Mod: CPTII,,, | Performed by: SURGERY

## 2024-11-20 NOTE — PROGRESS NOTES
Subjective:           Patient ID: Felix Smyth is a 39 y.o. female.    Chief Complaint: Follow-up (CT scan)      38-year-old female patient presents with a history of bulge and tenderness in the lower midline of the abdomen.  The patient reports that she has been noticing this for a few weeks.  She reports both tenderness and pain when changing positions.  She has not had any imaging, but was diagnosed with a hernia by primary care provider and referred for potential surgical management.    11/20  patient returns after having a CT scan to evaluate for possible hernia/abdominal wall defect.  The CT did not demonstrate hernia recurrence, with the intact abdominal wall throughout.    Patient reports that she continues to have discomfort lightly lateral to the umbilicus.  She has not necessarily noticed a bulge again.    Follow-up      family history includes Diabetes in her mother; No Known Problems in her daughter, father, son, and son.    Past Medical History:   Diagnosis Date    Depression 05/22/2024    Herpes simplex virus (HSV) infection     Hypertension     Mild intermittent asthma without complication 12/07/2022        Current Outpatient Medications on File Prior to Visit   Medication Sig Dispense Refill    albuterol (PROVENTIL/VENTOLIN HFA) 90 mcg/actuation inhaler albuterol sulfate HFA 90 mcg/actuation aerosol inhaler   Inhale 1 puff every 4-6 hours by inhalation route as needed. 18 g 5    atenoloL-chlorthalidone (TENORETIC) 50-25 mg Tab Take 1 tablet by mouth once daily. 90 tablet 1    budesonide-formoterol 160-4.5 mcg (SYMBICORT) 160-4.5 mcg/actuation HFAA Inhale 2 puffs into the lungs 2 (two) times daily. 6 g 1    cyclobenzaprine (FLEXERIL) 10 MG tablet Take 10 mg by mouth 3 (three) times daily.      FLUoxetine 20 MG capsule Take 3 tablets by mouth daily 270 capsule 1    ibuprofen (ADVIL,MOTRIN) 800 MG tablet Take 1 tablet (800 mg total) by mouth 3 (three) times daily as needed for Pain. 90 tablet 1     metroNIDAZOLE (FLAGYL) 500 MG tablet Take 1 tablet (500 mg total) by mouth every 12 (twelve) hours. for 7 days 14 tablet 0    promethazine (PHENERGAN) 25 MG tablet Take 1 tablet (25 mg total) by mouth every 6 (six) hours as needed for Nausea. 60 tablet 1    sumatriptan (IMITREX) 100 MG tablet Take 1 tablet at beginning of headache. May repeat in 2 hours if needed. Max 2/24 hours. 9 tablet 5    topiramate (TOPAMAX) 100 MG tablet Take 1 tablet (100 mg total) by mouth 2 (two) times daily. 60 tablet 5    triamcinolone acetonide 0.1% (KENALOG) 0.1 % cream Apply topically 2 (two) times daily. 80 g 1    zolpidem (AMBIEN) 5 MG Tab Take 1 tablet (5 mg total) by mouth every evening. 30 tablet 2     No current facility-administered medications on file prior to visit.       Review of patient's allergies indicates:   Allergen Reactions    Bactrim [sulfamethoxazole-trimethoprim] Hives    Hydrocodone Itching       Past Surgical History:   Procedure Laterality Date    CARPAL TUNNEL RELEASE Right 2024    Procedure: RELEASE, CARPAL TUNNEL;  Surgeon: Waldemar Mcintosh MD;  Location: [facility] OR;  Service: Orthopedics;  Laterality: Right;     SECTION      HYSTERECTOMY      LAPAROSCOPIC SALPINGO-OOPHORECTOMY Left 2021    Procedure: SALPINGO-OOPHORECTOMY, LAPAROSCOPIC;  Surgeon: Maria Tomas MD;  Location: [facility] OR;  Service: OB/GYN;  Laterality: Left;    LAPAROSCOPIC SURGICAL REMOVAL OF ECTOPIC PREGNANCY Right     LAPAROSCOPIC TOTAL HYSTERECTOMY Bilateral 2021    Procedure: HYSTERECTOMY, TOTAL, LAPAROSCOPIC;  Surgeon: Maria Tomas MD;  Location: [facility] OR;  Service: OB/GYN;  Laterality: Bilateral;         reports that she has never smoked. She has never been exposed to tobacco smoke. She has never used smokeless tobacco. She reports current alcohol use. She reports that she does not use drugs.       Review of Systems   All other systems reviewed and are negative.              Objective:      Ht 5' (1.524 m)   Wt 91 kg (200 lb 9.9 oz)   LMP  (LMP Unknown)   BMI 39.18 kg/m²    Physical Exam  Cardiovascular:      Rate and Rhythm: Normal rate and regular rhythm.   Pulmonary:      Effort: Pulmonary effort is normal.   Abdominal:      Palpations: Abdomen is soft.      Hernia: No hernia is present.   Skin:     Coloration: Skin is not jaundiced.   Neurological:      General: No focal deficit present.      Mental Status: She is alert.   Psychiatric:         Mood and Affect: Mood normal.         Assessment/Plan:     Problem List Items Addressed This Visit          GI    Abdominal pain    Current Assessment & Plan     Hernia has been ruled out by examination and CT scan.  For now, patient will continue to observe and monitor her symptoms.  She will notify us if she would like to be seen again.  Otherwise follow up PRN.          Other Visit Diagnoses       Postop check

## 2024-11-20 NOTE — ASSESSMENT & PLAN NOTE
Hernia has been ruled out by examination and CT scan.  For now, patient will continue to observe and monitor her symptoms.  She will notify us if she would like to be seen again.  Otherwise follow up PRN.

## 2024-12-03 ENCOUNTER — TELEPHONE (OUTPATIENT)
Dept: OBSTETRICS AND GYNECOLOGY | Facility: CLINIC | Age: 39
End: 2024-12-03
Payer: MEDICAID

## 2024-12-03 NOTE — TELEPHONE ENCOUNTER
----- Message from Maria Tomas MD sent at 11/14/2024  4:23 PM CST -----  Your Affirm test resulted in bacterial vaginosis. A prescription has been sent to the pharmacy on file. Thank you.   Your lab results indicated vaginal candidasis (yeast). A prescription was sent to the pharmacy on file. Thank you.

## 2025-02-06 ENCOUNTER — OFFICE VISIT (OUTPATIENT)
Dept: FAMILY MEDICINE | Facility: CLINIC | Age: 40
End: 2025-02-06
Payer: MEDICAID

## 2025-02-06 VITALS
WEIGHT: 194 LBS | OXYGEN SATURATION: 98 % | DIASTOLIC BLOOD PRESSURE: 68 MMHG | HEART RATE: 77 BPM | RESPIRATION RATE: 18 BRPM | BODY MASS INDEX: 38.09 KG/M2 | SYSTOLIC BLOOD PRESSURE: 94 MMHG | HEIGHT: 60 IN | TEMPERATURE: 97 F

## 2025-02-06 DIAGNOSIS — Z12.31 ENCOUNTER FOR SCREENING MAMMOGRAM FOR MALIGNANT NEOPLASM OF BREAST: ICD-10-CM

## 2025-02-06 DIAGNOSIS — M17.12 PRIMARY OSTEOARTHRITIS OF LEFT KNEE: ICD-10-CM

## 2025-02-06 DIAGNOSIS — L30.9 ECZEMA, UNSPECIFIED TYPE: ICD-10-CM

## 2025-02-06 DIAGNOSIS — F32.89 OTHER DEPRESSION: ICD-10-CM

## 2025-02-06 DIAGNOSIS — I10 PRIMARY HYPERTENSION: Primary | ICD-10-CM

## 2025-02-06 DIAGNOSIS — R51.9 NONINTRACTABLE HEADACHE, UNSPECIFIED CHRONICITY PATTERN, UNSPECIFIED HEADACHE TYPE: ICD-10-CM

## 2025-02-06 DIAGNOSIS — G47.00 INSOMNIA, UNSPECIFIED TYPE: ICD-10-CM

## 2025-02-06 DIAGNOSIS — Z79.899 LONG TERM USE OF DRUG: ICD-10-CM

## 2025-02-06 LAB
ALBUMIN SERPL BCP-MCNC: 3.7 G/DL (ref 3.5–5)
ALBUMIN/GLOB SERPL: 0.8 {RATIO}
ALP SERPL-CCNC: 85 U/L (ref 40–150)
ALT SERPL W P-5'-P-CCNC: 68 U/L
ANION GAP SERPL CALCULATED.3IONS-SCNC: 13 MMOL/L (ref 7–16)
AST SERPL W P-5'-P-CCNC: 106 U/L (ref 5–34)
BASOPHILS # BLD AUTO: 0.03 K/UL (ref 0–0.2)
BASOPHILS NFR BLD AUTO: 0.3 % (ref 0–1)
BILIRUB SERPL-MCNC: 0.6 MG/DL
BUN SERPL-MCNC: 8 MG/DL (ref 7–19)
BUN/CREAT SERPL: 8 (ref 6–20)
CALCIUM SERPL-MCNC: 9.8 MG/DL (ref 8.4–10.2)
CHLORIDE SERPL-SCNC: 99 MMOL/L (ref 98–107)
CHOLEST SERPL-MCNC: 197 MG/DL
CHOLEST/HDLC SERPL: 4.7 {RATIO}
CO2 SERPL-SCNC: 26 MMOL/L (ref 22–29)
CREAT SERPL-MCNC: 0.97 MG/DL (ref 0.55–1.02)
CTP QC/QA: YES
DIFFERENTIAL METHOD BLD: ABNORMAL
EGFR (NO RACE VARIABLE) (RUSH/TITUS): 75 ML/MIN/1.73M2
EOSINOPHIL # BLD AUTO: 0.16 K/UL (ref 0–0.5)
EOSINOPHIL NFR BLD AUTO: 1.6 % (ref 1–4)
ERYTHROCYTE [DISTWIDTH] IN BLOOD BY AUTOMATED COUNT: 14.7 % (ref 11.5–14.5)
GLOBULIN SER-MCNC: 4.4 G/DL (ref 2–4)
GLUCOSE SERPL-MCNC: 98 MG/DL (ref 74–100)
HCT VFR BLD AUTO: 38.6 % (ref 38–47)
HDLC SERPL-MCNC: 42 MG/DL (ref 35–60)
HGB BLD-MCNC: 12.8 G/DL (ref 12–16)
IMM GRANULOCYTES # BLD AUTO: 0.06 K/UL (ref 0–0.04)
IMM GRANULOCYTES NFR BLD: 0.6 % (ref 0–0.4)
LDLC SERPL CALC-MCNC: 137 MG/DL
LDLC/HDLC SERPL: 3.3 {RATIO}
LYMPHOCYTES # BLD AUTO: 3.83 K/UL (ref 1–4.8)
LYMPHOCYTES NFR BLD AUTO: 38.8 % (ref 27–41)
MCH RBC QN AUTO: 28.6 PG (ref 27–31)
MCHC RBC AUTO-ENTMCNC: 33.2 G/DL (ref 32–36)
MCV RBC AUTO: 86.2 FL (ref 80–96)
MONOCYTES # BLD AUTO: 0.74 K/UL (ref 0–0.8)
MONOCYTES NFR BLD AUTO: 7.5 % (ref 2–6)
MPC BLD CALC-MCNC: 9.1 FL (ref 9.4–12.4)
NEUTROPHILS # BLD AUTO: 5.05 K/UL (ref 1.8–7.7)
NEUTROPHILS NFR BLD AUTO: 51.2 % (ref 53–65)
NONHDLC SERPL-MCNC: 155 MG/DL
NRBC # BLD AUTO: 0 X10E3/UL
NRBC, AUTO (.00): 0 %
PLATELET # BLD AUTO: 430 K/UL (ref 150–400)
POC (AMP) AMPHETAMINE: NEGATIVE
POC (BAR) BARBITURATES: NEGATIVE
POC (BUP) BUPRENORPHINE: NEGATIVE
POC (BZO) BENZODIAZEPINES: NEGATIVE
POC (COC) COCAINE: NEGATIVE
POC (MDMA) METHYLENEDIOXYMETHAMPHETAMINE 3,4: NEGATIVE
POC (MET) METHAMPHETAMINE: NEGATIVE
POC (MOP) OPIATES: NEGATIVE
POC (MTD) METHADONE: NEGATIVE
POC (OXY) OXYCODONE: NEGATIVE
POC (PCP) PHENCYCLIDINE: NEGATIVE
POC (TCA) TRICYCLIC ANTIDEPRESSANTS: NORMAL
POC TEMPERATURE (URINE): 92
POC THC: NEGATIVE
POTASSIUM SERPL-SCNC: 2.4 MMOL/L (ref 3.5–5.1)
PROT SERPL-MCNC: 8.1 G/DL (ref 6.4–8.3)
RBC # BLD AUTO: 4.48 M/UL (ref 4.2–5.4)
SODIUM SERPL-SCNC: 136 MMOL/L (ref 136–145)
TRIGL SERPL-MCNC: 88 MG/DL (ref 37–140)
VLDLC SERPL-MCNC: 18 MG/DL
WBC # BLD AUTO: 9.87 K/UL (ref 4.5–11)

## 2025-02-06 PROCEDURE — 3008F BODY MASS INDEX DOCD: CPT | Mod: CPTII,,, | Performed by: FAMILY MEDICINE

## 2025-02-06 PROCEDURE — 80061 LIPID PANEL: CPT | Mod: ,,, | Performed by: CLINICAL MEDICAL LABORATORY

## 2025-02-06 PROCEDURE — 85025 COMPLETE CBC W/AUTO DIFF WBC: CPT | Mod: ,,, | Performed by: CLINICAL MEDICAL LABORATORY

## 2025-02-06 PROCEDURE — 99214 OFFICE O/P EST MOD 30 MIN: CPT | Mod: ,,, | Performed by: FAMILY MEDICINE

## 2025-02-06 PROCEDURE — 1159F MED LIST DOCD IN RCRD: CPT | Mod: CPTII,,, | Performed by: FAMILY MEDICINE

## 2025-02-06 PROCEDURE — 80053 COMPREHEN METABOLIC PANEL: CPT | Mod: ,,, | Performed by: CLINICAL MEDICAL LABORATORY

## 2025-02-06 PROCEDURE — 3074F SYST BP LT 130 MM HG: CPT | Mod: CPTII,,, | Performed by: FAMILY MEDICINE

## 2025-02-06 PROCEDURE — 3078F DIAST BP <80 MM HG: CPT | Mod: CPTII,,, | Performed by: FAMILY MEDICINE

## 2025-02-06 PROCEDURE — 1160F RVW MEDS BY RX/DR IN RCRD: CPT | Mod: CPTII,,, | Performed by: FAMILY MEDICINE

## 2025-02-06 PROCEDURE — 80305 DRUG TEST PRSMV DIR OPT OBS: CPT | Mod: RHCUB | Performed by: FAMILY MEDICINE

## 2025-02-06 RX ORDER — TRIAMCINOLONE ACETONIDE 1 MG/G
OINTMENT TOPICAL 2 TIMES DAILY
Qty: 80 G | Refills: 1 | Status: SHIPPED | OUTPATIENT
Start: 2025-02-06

## 2025-02-06 RX ORDER — ATENOLOL AND CHLORTHALIDONE TABLET 50; 25 MG/1; MG/1
1 TABLET ORAL DAILY
Qty: 90 TABLET | Refills: 1 | Status: SHIPPED | OUTPATIENT
Start: 2025-02-06

## 2025-02-06 RX ORDER — SUMATRIPTAN SUCCINATE 100 MG/1
TABLET ORAL
Qty: 9 TABLET | Refills: 5 | Status: SHIPPED | OUTPATIENT
Start: 2025-02-06

## 2025-02-06 RX ORDER — TOPIRAMATE 25 MG/1
50 TABLET ORAL 2 TIMES DAILY
COMMUNITY
Start: 2025-01-13

## 2025-02-06 RX ORDER — CYCLOBENZAPRINE HCL 10 MG
10 TABLET ORAL 3 TIMES DAILY
Qty: 30 TABLET | Refills: 3 | Status: SHIPPED | OUTPATIENT
Start: 2025-02-06

## 2025-02-06 RX ORDER — ZOLPIDEM TARTRATE 5 MG/1
5 TABLET ORAL NIGHTLY
Qty: 30 TABLET | Refills: 2 | Status: SHIPPED | OUTPATIENT
Start: 2025-02-06

## 2025-02-06 RX ORDER — FLUOXETINE HYDROCHLORIDE 20 MG/1
CAPSULE ORAL
Qty: 270 CAPSULE | Refills: 1 | Status: SHIPPED | OUTPATIENT
Start: 2025-02-06

## 2025-02-06 NOTE — PROGRESS NOTES
New Clinic Note    Felix Smyth is a 39 y.o. female     CC:   Chief Complaint   Patient presents with    Insomnia     Patient is here for 3 month follow up for refills on her Ambien prescription sent to [facility].     Follow-up        Subjective    History of Present Illness HPI   Patient is for evaluation of chronic medical problems. Patient needs refills. Felix  is tolerating medications well without side effects.  Patient has been out of her fluoxetine for a few weeks.      Current Outpatient Medications:     albuterol (PROVENTIL/VENTOLIN HFA) 90 mcg/actuation inhaler, albuterol sulfate HFA 90 mcg/actuation aerosol inhaler  Inhale 1 puff every 4-6 hours by inhalation route as needed., Disp: 18 g, Rfl: 5    budesonide-formoterol 160-4.5 mcg (SYMBICORT) 160-4.5 mcg/actuation HFAA, Inhale 2 puffs into the lungs 2 (two) times daily., Disp: 6 g, Rfl: 1    ibuprofen (ADVIL,MOTRIN) 800 MG tablet, Take 1 tablet (800 mg total) by mouth 3 (three) times daily as needed for Pain., Disp: 90 tablet, Rfl: 1    promethazine (PHENERGAN) 25 MG tablet, Take 1 tablet (25 mg total) by mouth every 6 (six) hours as needed for Nausea., Disp: 60 tablet, Rfl: 1    topiramate (TOPAMAX) 100 MG tablet, Take 1 tablet (100 mg total) by mouth 2 (two) times daily., Disp: 60 tablet, Rfl: 5    topiramate (TOPAMAX) 25 MG tablet, Take 50 mg by mouth 2 (two) times daily., Disp: , Rfl:     atenoloL-chlorthalidone (TENORETIC) 50-25 mg Tab, Take 1 tablet by mouth once daily., Disp: 90 tablet, Rfl: 1    cyclobenzaprine (FLEXERIL) 10 MG tablet, Take 1 tablet (10 mg total) by mouth 3 (three) times daily., Disp: 30 tablet, Rfl: 3    FLUoxetine 20 MG capsule, Take 3 tablets by mouth daily, Disp: 270 capsule, Rfl: 1    sumatriptan (IMITREX) 100 MG tablet, Take 1 tablet at beginning of headache. May repeat in 2 hours if needed. Max 2/24 hours., Disp: 9 tablet, Rfl: 5    triamcinolone acetonide 0.1% (KENALOG) 0.1 % ointment, Apply topically 2  (two) times daily., Disp: 80 g, Rfl: 1    zolpidem (AMBIEN) 5 MG Tab, Take 1 tablet (5 mg total) by mouth every evening., Disp: 30 tablet, Rfl: 2     Past Medical History:   Diagnosis Date    Depression 2024    Herpes simplex virus (HSV) infection     Hypertension     Mild intermittent asthma without complication 2022        Family History   Problem Relation Name Age of Onset    No Known Problems Father      Diabetes Mother      No Known Problems Son      No Known Problems Son      No Known Problems Daughter          Past Surgical History:   Procedure Laterality Date    CARPAL TUNNEL RELEASE Right 2024    Procedure: RELEASE, CARPAL TUNNEL;  Surgeon: Waldemar Mcintosh MD;  Location: [facility];  Service: Orthopedics;  Laterality: Right;     SECTION      HYSTERECTOMY      LAPAROSCOPIC SALPINGO-OOPHORECTOMY Left 2021    Procedure: SALPINGO-OOPHORECTOMY, LAPAROSCOPIC;  Surgeon: Maria Tomas MD;  Location: [facility];  Service: OB/GYN;  Laterality: Left;    LAPAROSCOPIC SURGICAL REMOVAL OF ECTOPIC PREGNANCY Right     LAPAROSCOPIC TOTAL HYSTERECTOMY Bilateral 2021    Procedure: HYSTERECTOMY, TOTAL, LAPAROSCOPIC;  Surgeon: Maria Tomas MD;  Location: [facility];  Service: OB/GYN;  Laterality: Bilateral;        Review of Systems   Constitutional:  Negative for fatigue and fever.   HENT:  Negative for ear pain, postnasal drip, rhinorrhea and sinus pressure/congestion.    Respiratory:  Negative for cough and shortness of breath.    Cardiovascular:  Negative for chest pain.   Gastrointestinal:  Negative for abdominal pain, diarrhea, nausea and vomiting.   Genitourinary:  Negative for dysuria.   Neurological:  Negative for headaches.        BP 94/68 (BP Location: Left arm, Patient Position: Sitting)   Pulse 77   Temp 97.2 °F (36.2 °C) (Oral)   Resp 18   Ht 5' (1.524 m)   Wt 88 kg (194 lb)   LMP  (LMP Unknown)   SpO2 98%   BMI 37.89 kg/m²       Physical Exam  HENT:      Head: Normocephalic and atraumatic.   Cardiovascular:      Rate and Rhythm: Normal rate and regular rhythm.   Pulmonary:      Effort: Pulmonary effort is normal.      Breath sounds: Normal breath sounds.   Neurological:      Mental Status: She is alert and oriented to person, place, and time.   Psychiatric:         Mood and Affect: Mood normal.         Behavior: Behavior normal.          Assessment and Plan      ICD-10-CM ICD-9-CM   1. Primary hypertension  I10 401.9   2. Long term use of drug  Z79.899 V58.69   3. Insomnia, unspecified type  G47.00 780.52   4. Other depression  F32.89 311   5. Primary osteoarthritis of left knee  M17.12 715.16   6. Eczema, unspecified type  L30.9 692.9   7. Encounter for screening mammogram for malignant neoplasm of breast  Z12.31 V76.12   8. Nonintractable headache, unspecified chronicity pattern, unspecified headache type  R51.9 784.0        1. Primary hypertension  The current medical regimen is effective;  continue present plan and medications.  -     atenoloL-chlorthalidone (TENORETIC) 50-25 mg Tab; Take 1 tablet by mouth once daily.  Dispense: 90 tablet; Refill: 1  -     Comprehensive Metabolic Panel; Future; Expected date: 02/06/2025  -     CBC Auto Differential; Future; Expected date: 02/06/2025  -     Lipid Panel; Future; Expected date: 02/06/2025    2. Long term use of drug   and UDS were reviewed and consistent.   -     POCT Urine Drug Screen Presump  -     zolpidem (AMBIEN) 5 MG Tab; Take 1 tablet (5 mg total) by mouth every evening.  Dispense: 30 tablet; Refill: 2    3. Insomnia, unspecified type  The current medical regimen is effective;  continue present plan and medications.  -     POCT Urine Drug Screen Presump  -     zolpidem (AMBIEN) 5 MG Tab; Take 1 tablet (5 mg total) by mouth every evening.  Dispense: 30 tablet; Refill: 2    4. Other depression  The current medical regimen is effective;  continue present plan and medications.  -      FLUoxetine 20 MG capsule; Take 3 tablets by mouth daily  Dispense: 270 capsule; Refill: 1    5. Primary osteoarthritis of left knee  The current medical regimen is effective;  continue present plan and medications.  -     cyclobenzaprine (FLEXERIL) 10 MG tablet; Take 1 tablet (10 mg total) by mouth 3 (three) times daily.  Dispense: 30 tablet; Refill: 3    6. Eczema, unspecified type  The current medical regimen is effective;  continue present plan and medications.  -     triamcinolone acetonide 0.1% (KENALOG) 0.1 % ointment; Apply topically 2 (two) times daily.  Dispense: 80 g; Refill: 1    7. Encounter for screening mammogram for malignant neoplasm of breast  -     Mammo Digital Screening Bilat w/ [facility]; Future; Expected date: 02/06/2025    8. Nonintractable headache, unspecified chronicity pattern, unspecified headache type  The current medical regimen is effective;  continue present plan and medications.  -     sumatriptan (IMITREX) 100 MG tablet; Take 1 tablet at beginning of headache. May repeat in 2 hours if needed. Max 2/24 hours.  Dispense: 9 tablet; Refill: 5        Follow up in about 3 months (around 5/6/2025).

## 2025-02-07 DIAGNOSIS — E87.6 HYPOKALEMIA: Primary | ICD-10-CM

## 2025-02-07 DIAGNOSIS — R74.8 ABNORMAL LIVER ENZYMES: ICD-10-CM

## 2025-02-07 RX ORDER — POTASSIUM CHLORIDE 20 MEQ/1
20 TABLET, EXTENDED RELEASE ORAL 2 TIMES DAILY
Qty: 6 TABLET | Refills: 0 | Status: SHIPPED | OUTPATIENT
Start: 2025-02-07

## 2025-02-07 NOTE — PROGRESS NOTES
Liver enzymes are elevated and potassium is very low. Start KCL 20meq poBID x 3 days. Repeat blood work on Monday. If she has chest pain or heart palpitations go to the ER. Did anything different happen?

## 2025-02-07 NOTE — TELEPHONE ENCOUNTER
----- Message from Felisa Galeas MD sent at 2/7/2025  9:44 AM CST -----  Liver enzymes are elevated and potassium is very low. Start KCL 20meq poBID x 3 days. Repeat blood work on Monday. If she has chest pain or heart palpitations go to the ER. Did anything different happen?

## 2025-04-09 ENCOUNTER — TELEPHONE (OUTPATIENT)
Dept: FAMILY MEDICINE | Facility: CLINIC | Age: 40
End: 2025-04-09
Payer: MEDICAID

## 2025-05-05 ENCOUNTER — OFFICE VISIT (OUTPATIENT)
Dept: FAMILY MEDICINE | Facility: CLINIC | Age: 40
End: 2025-05-05
Payer: MEDICAID

## 2025-05-05 VITALS
HEART RATE: 66 BPM | RESPIRATION RATE: 18 BRPM | TEMPERATURE: 99 F | SYSTOLIC BLOOD PRESSURE: 96 MMHG | WEIGHT: 183 LBS | OXYGEN SATURATION: 97 % | HEIGHT: 60 IN | BODY MASS INDEX: 35.93 KG/M2 | DIASTOLIC BLOOD PRESSURE: 66 MMHG

## 2025-05-05 DIAGNOSIS — J45.20 MILD INTERMITTENT ASTHMA WITHOUT COMPLICATION: ICD-10-CM

## 2025-05-05 DIAGNOSIS — R11.0 NAUSEA: ICD-10-CM

## 2025-05-05 DIAGNOSIS — L30.9 ECZEMA, UNSPECIFIED TYPE: ICD-10-CM

## 2025-05-05 DIAGNOSIS — M17.12 PRIMARY OSTEOARTHRITIS OF LEFT KNEE: ICD-10-CM

## 2025-05-05 DIAGNOSIS — G89.29 CHRONIC PAIN OF LEFT KNEE: ICD-10-CM

## 2025-05-05 DIAGNOSIS — F32.89 OTHER DEPRESSION: ICD-10-CM

## 2025-05-05 DIAGNOSIS — G43.709 CHRONIC MIGRAINE WITHOUT AURA WITHOUT STATUS MIGRAINOSUS, NOT INTRACTABLE: ICD-10-CM

## 2025-05-05 DIAGNOSIS — Z12.31 ENCOUNTER FOR SCREENING MAMMOGRAM FOR MALIGNANT NEOPLASM OF BREAST: ICD-10-CM

## 2025-05-05 DIAGNOSIS — E87.6 HYPOKALEMIA: ICD-10-CM

## 2025-05-05 DIAGNOSIS — I10 PRIMARY HYPERTENSION: ICD-10-CM

## 2025-05-05 DIAGNOSIS — R51.9 NONINTRACTABLE HEADACHE, UNSPECIFIED CHRONICITY PATTERN, UNSPECIFIED HEADACHE TYPE: ICD-10-CM

## 2025-05-05 DIAGNOSIS — Z79.899 LONG TERM USE OF DRUG: ICD-10-CM

## 2025-05-05 DIAGNOSIS — M25.562 CHRONIC PAIN OF LEFT KNEE: ICD-10-CM

## 2025-05-05 DIAGNOSIS — G47.00 INSOMNIA, UNSPECIFIED TYPE: Primary | ICD-10-CM

## 2025-05-05 DIAGNOSIS — Z80.0 FAMILY HISTORY OF COLON CANCER: ICD-10-CM

## 2025-05-05 DIAGNOSIS — K59.00 CONSTIPATION, UNSPECIFIED CONSTIPATION TYPE: ICD-10-CM

## 2025-05-05 LAB

## 2025-05-05 PROCEDURE — 3078F DIAST BP <80 MM HG: CPT | Mod: CPTII,,, | Performed by: FAMILY MEDICINE

## 2025-05-05 PROCEDURE — 1159F MED LIST DOCD IN RCRD: CPT | Mod: CPTII,,, | Performed by: FAMILY MEDICINE

## 2025-05-05 PROCEDURE — 80305 DRUG TEST PRSMV DIR OPT OBS: CPT | Mod: RHCUB | Performed by: FAMILY MEDICINE

## 2025-05-05 PROCEDURE — 99214 OFFICE O/P EST MOD 30 MIN: CPT | Mod: ,,, | Performed by: FAMILY MEDICINE

## 2025-05-05 PROCEDURE — 3074F SYST BP LT 130 MM HG: CPT | Mod: CPTII,,, | Performed by: FAMILY MEDICINE

## 2025-05-05 PROCEDURE — 3008F BODY MASS INDEX DOCD: CPT | Mod: CPTII,,, | Performed by: FAMILY MEDICINE

## 2025-05-05 PROCEDURE — 1160F RVW MEDS BY RX/DR IN RCRD: CPT | Mod: CPTII,,, | Performed by: FAMILY MEDICINE

## 2025-05-05 RX ORDER — ALBUTEROL SULFATE 90 UG/1
INHALANT RESPIRATORY (INHALATION)
Qty: 18 G | Refills: 5 | Status: SHIPPED | OUTPATIENT
Start: 2025-05-05

## 2025-05-05 RX ORDER — POTASSIUM CHLORIDE 20 MEQ/1
20 TABLET, EXTENDED RELEASE ORAL 2 TIMES DAILY
Qty: 6 TABLET | Refills: 0 | Status: SHIPPED | OUTPATIENT
Start: 2025-05-05

## 2025-05-05 RX ORDER — TRIAMCINOLONE ACETONIDE 1 MG/G
OINTMENT TOPICAL 2 TIMES DAILY
Qty: 80 G | Refills: 1 | Status: SHIPPED | OUTPATIENT
Start: 2025-05-05

## 2025-05-05 RX ORDER — FLUOXETINE HYDROCHLORIDE 20 MG/1
CAPSULE ORAL
Qty: 270 CAPSULE | Refills: 1 | Status: SHIPPED | OUTPATIENT
Start: 2025-05-05

## 2025-05-05 RX ORDER — TOPIRAMATE 100 MG/1
100 TABLET, FILM COATED ORAL 2 TIMES DAILY
Qty: 60 TABLET | Refills: 5 | Status: SHIPPED | OUTPATIENT
Start: 2025-05-05 | End: 2026-05-05

## 2025-05-05 RX ORDER — CYCLOBENZAPRINE HCL 10 MG
10 TABLET ORAL 3 TIMES DAILY
Qty: 30 TABLET | Refills: 3 | Status: SHIPPED | OUTPATIENT
Start: 2025-05-05

## 2025-05-05 RX ORDER — PROMETHAZINE HYDROCHLORIDE 25 MG/1
25 TABLET ORAL EVERY 6 HOURS PRN
Qty: 60 TABLET | Refills: 1 | Status: SHIPPED | OUTPATIENT
Start: 2025-05-05

## 2025-05-05 RX ORDER — TOPIRAMATE 25 MG/1
50 TABLET ORAL 2 TIMES DAILY
Qty: 30 TABLET | Refills: 2 | Status: CANCELLED | OUTPATIENT
Start: 2025-05-05

## 2025-05-05 RX ORDER — ATENOLOL AND CHLORTHALIDONE TABLET 50; 25 MG/1; MG/1
1 TABLET ORAL DAILY
Qty: 90 TABLET | Refills: 1 | Status: SHIPPED | OUTPATIENT
Start: 2025-05-05

## 2025-05-05 RX ORDER — ZOLPIDEM TARTRATE 5 MG/1
5 TABLET ORAL NIGHTLY
Qty: 30 TABLET | Refills: 2 | Status: SHIPPED | OUTPATIENT
Start: 2025-05-05

## 2025-05-05 RX ORDER — SUMATRIPTAN SUCCINATE 100 MG/1
TABLET ORAL
Qty: 9 TABLET | Refills: 5 | Status: SHIPPED | OUTPATIENT
Start: 2025-05-05

## 2025-05-05 RX ORDER — IBUPROFEN 800 MG/1
800 TABLET ORAL 3 TIMES DAILY PRN
Qty: 90 TABLET | Refills: 1 | Status: SHIPPED | OUTPATIENT
Start: 2025-05-05

## 2025-05-05 RX ORDER — BUDESONIDE AND FORMOTEROL FUMARATE DIHYDRATE 160; 4.5 UG/1; UG/1
2 AEROSOL RESPIRATORY (INHALATION) 2 TIMES DAILY
Qty: 6 G | Refills: 1 | Status: SHIPPED | OUTPATIENT
Start: 2025-05-05

## 2025-05-05 NOTE — PROGRESS NOTES
New Clinic Note    Felix Smyth is a 39 y.o. female     CC:   Chief Complaint   Patient presents with    Insomnia     Patient is here for three month follow up visit to get renewal of Ambien sent to [facility]. 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  Wants Colonoscopy scheduled even though she is only 39 because her Grand father  of colon cancer and first cousin just diagnosed with colon cancer. Wants A1C screen & wants mammogram at [facility].    Medication Refill    Hypertension    Headache    Asthma    Anxiety    Eczema    Health Maintenance     TETANUS VACCINE declined  Mammogram wants scheduled at [facility]  Pneumococcal Vaccines (Age 0-49)(1  - PCV) declined  Hemoglobin A1c (Diabetic Prevention Screening) wants this performed today  COVID-19 Vaccine(3 - 2024- season) declined        Subjective    History of Present Illness HPI   Patient is for evaluation of chronic medical problems. Patient needs refills. Felix  is tolerating medications well without side effects.   Patient needs mammogram. Patient complains of constipation. Patient's maternal grandfather  from colon cancer. Her first cousin has been diagnosed with colon cancer at 43.    Current Medications[1]     Past Medical History:   Diagnosis Date    Depression 2024    Herpes simplex virus (HSV) infection     Hypertension     Mild intermittent asthma without complication 2022        Family History   Problem Relation Name Age of Onset    Diabetes Mother      No Known Problems Father      No Known Problems Daughter      No Known Problems Son      No Known Problems Son      Cancer Maternal Grandfather      Cancer Maternal Cousin      Cancer Paternal Cousin          Past Surgical History:   Procedure Laterality Date    CARPAL TUNNEL RELEASE Right 2024    Procedure: RELEASE, CARPAL TUNNEL;  Surgeon: Waldemar Mcintosh MD;  Location: [facility];   Service: Orthopedics;  Laterality: Right;     SECTION      HYSTERECTOMY      LAPAROSCOPIC SALPINGO-OOPHORECTOMY Left 2021    Procedure: SALPINGO-OOPHORECTOMY, LAPAROSCOPIC;  Surgeon: Maria Tomas MD;  Location: [facility];  Service: OB/GYN;  Laterality: Left;    LAPAROSCOPIC SURGICAL REMOVAL OF ECTOPIC PREGNANCY Right     LAPAROSCOPIC TOTAL HYSTERECTOMY Bilateral 2021    Procedure: HYSTERECTOMY, TOTAL, LAPAROSCOPIC;  Surgeon: Maria Tomas MD;  Location: [facility];  Service: OB/GYN;  Laterality: Bilateral;        Review of Systems   Constitutional:  Negative for fatigue and fever.   HENT:  Negative for ear pain, postnasal drip, rhinorrhea and sinus pressure/congestion.    Respiratory:  Negative for cough and shortness of breath.    Cardiovascular:  Negative for chest pain.   Gastrointestinal:  Negative for abdominal pain, diarrhea, nausea and vomiting.   Genitourinary:  Negative for dysuria.   Neurological:  Negative for headaches.        BP 96/66 (BP Location: Left arm, Patient Position: Sitting)   Pulse 66   Temp 98.6 °F (37 °C) (Oral)   Resp 18   Ht 5' (1.524 m)   Wt 83 kg (183 lb)   LMP  (LMP Unknown)   SpO2 97%   BMI 35.74 kg/m²      Physical Exam  HENT:      Head: Normocephalic and atraumatic.   Cardiovascular:      Rate and Rhythm: Normal rate and regular rhythm.   Pulmonary:      Effort: Pulmonary effort is normal.      Breath sounds: Normal breath sounds.   Neurological:      Mental Status: She is alert and oriented to person, place, and time.   Psychiatric:         Mood and Affect: Mood normal.         Behavior: Behavior normal.          Assessment and Plan      ICD-10-CM ICD-9-CM   1. Insomnia, unspecified type  G47.00 780.52   2. Long term use of drug  Z79.899 V58.69   3. Chronic migraine without aura without status migrainosus, not intractable  G43.709 346.70   4. Nonintractable headache, unspecified chronicity pattern, unspecified headache type  R51.9  784.0   5. Nausea  R11.0 787.02   6. Hypokalemia  E87.6 276.8   7. Other depression  F32.89 311   8. Primary osteoarthritis of left knee  M17.12 715.16   9. Mild intermittent asthma without complication  J45.20 493.90   10. Primary hypertension  I10 401.9   11. Chronic pain of left knee  M25.562 719.46    G89.29 338.29   12. Eczema, unspecified type  L30.9 692.9   13. Encounter for screening mammogram for malignant neoplasm of breast  Z12.31 V76.12   14. Constipation, unspecified constipation type  K59.00 564.00   15. Family history of colon cancer  Z80.0 V16.0        1. Insomnia, unspecified type  The current medical regimen is effective;  continue present plan and medications.  -     POCT Urine Drug Screen Presump  -     zolpidem (AMBIEN) 5 MG Tab; Take 1 tablet (5 mg total) by mouth every evening.  Dispense: 30 tablet; Refill: 2    2. Long term use of drug   and UDS were reviewed and consistent.   -     POCT Urine Drug Screen Presump  -     zolpidem (AMBIEN) 5 MG Tab; Take 1 tablet (5 mg total) by mouth every evening.  Dispense: 30 tablet; Refill: 2    3. Chronic migraine without aura without status migrainosus, not intractable  The current medical regimen is effective;  continue present plan and medications.  -     topiramate (TOPAMAX) 100 MG tablet; Take 1 tablet (100 mg total) by mouth 2 (two) times daily.  Dispense: 60 tablet; Refill: 5    4. Nonintractable headache, unspecified chronicity pattern, unspecified headache type  The current medical regimen is effective;  continue present plan and medications.  -     sumatriptan (IMITREX) 100 MG tablet; Take 1 tablet at beginning of headache. May repeat in 2 hours if needed. Max 2/24 hours.  Dispense: 9 tablet; Refill: 5    5. Nausea  -     promethazine (PHENERGAN) 25 MG tablet; Take 1 tablet (25 mg total) by mouth every 6 (six) hours as needed for Nausea.  Dispense: 60 tablet; Refill: 1    6. Hypokalemia  -     potassium chloride SA (K-DUR,KLOR-CON) 20 MEQ  tablet; Take 1 tablet (20 mEq total) by mouth 2 (two) times daily.  Dispense: 6 tablet; Refill: 0    7. Other depression  The current medical regimen is effective;  continue present plan and medications.  -     FLUoxetine 20 MG capsule; Take 3 tablets by mouth daily  Dispense: 270 capsule; Refill: 1    8. Primary osteoarthritis of left knee  The current medical regimen is effective;  continue present plan and medications.  -     cyclobenzaprine (FLEXERIL) 10 MG tablet; Take 1 tablet (10 mg total) by mouth 3 (three) times daily.  Dispense: 30 tablet; Refill: 3    9. Mild intermittent asthma without complication  The current medical regimen is effective;  continue present plan and medications.  -     budesonide-formoterol 160-4.5 mcg (SYMBICORT) 160-4.5 mcg/actuation HFAA; Inhale 2 puffs into the lungs 2 (two) times daily.  Dispense: 6 g; Refill: 1  -     albuterol (PROVENTIL/VENTOLIN HFA) 90 mcg/actuation inhaler; albuterol sulfate HFA 90 mcg/actuation aerosol inhaler   Inhale 1 puff every 4-6 hours by inhalation route as needed.  Dispense: 18 g; Refill: 5    10. Primary hypertension  The current medical regimen is effective;  continue present plan and medications.  -     atenoloL-chlorthalidone (TENORETIC) 50-25 mg Tab; Take 1 tablet by mouth once daily.  Dispense: 90 tablet; Refill: 1    11. Chronic pain of left knee  -     ibuprofen (ADVIL,MOTRIN) 800 MG tablet; Take 1 tablet (800 mg total) by mouth 3 (three) times daily as needed for Pain.  Dispense: 90 tablet; Refill: 1    12. Eczema, unspecified type  The current medical regimen is effective;  continue present plan and medications.  -     triamcinolone acetonide 0.1% (KENALOG) 0.1 % ointment; Apply topically 2 (two) times daily.  Dispense: 80 g; Refill: 1    13. Encounter for screening mammogram for malignant neoplasm of breast  -     Mammo Digital Screening Bilat w/ [facility] (XPD); Future; Expected date: 05/05/2025    14. Constipation, unspecified constipation  type  Refer to GI.   -     Ambulatory referral/consult to Gastroenterology; Future; Expected date: 05/12/2025    15. Family history of colon cancer  -     Ambulatory referral/consult to Gastroenterology; Future; Expected date: 05/12/2025         Follow up in about 3 months (around 8/5/2025).            [1]   Current Outpatient Medications:     albuterol (PROVENTIL/VENTOLIN HFA) 90 mcg/actuation inhaler, albuterol sulfate HFA 90 mcg/actuation aerosol inhaler  Inhale 1 puff every 4-6 hours by inhalation route as needed., Disp: 18 g, Rfl: 5    atenoloL-chlorthalidone (TENORETIC) 50-25 mg Tab, Take 1 tablet by mouth once daily., Disp: 90 tablet, Rfl: 1    budesonide-formoterol 160-4.5 mcg (SYMBICORT) 160-4.5 mcg/actuation HFAA, Inhale 2 puffs into the lungs 2 (two) times daily., Disp: 6 g, Rfl: 1    cyclobenzaprine (FLEXERIL) 10 MG tablet, Take 1 tablet (10 mg total) by mouth 3 (three) times daily., Disp: 30 tablet, Rfl: 3    FLUoxetine 20 MG capsule, Take 3 tablets by mouth daily, Disp: 270 capsule, Rfl: 1    ibuprofen (ADVIL,MOTRIN) 800 MG tablet, Take 1 tablet (800 mg total) by mouth 3 (three) times daily as needed for Pain., Disp: 90 tablet, Rfl: 1    potassium chloride SA (K-DUR,KLOR-CON) 20 MEQ tablet, Take 1 tablet (20 mEq total) by mouth 2 (two) times daily., Disp: 6 tablet, Rfl: 0    promethazine (PHENERGAN) 25 MG tablet, Take 1 tablet (25 mg total) by mouth every 6 (six) hours as needed for Nausea., Disp: 60 tablet, Rfl: 1    sumatriptan (IMITREX) 100 MG tablet, Take 1 tablet at beginning of headache. May repeat in 2 hours if needed. Max 2/24 hours., Disp: 9 tablet, Rfl: 5    topiramate (TOPAMAX) 100 MG tablet, Take 1 tablet (100 mg total) by mouth 2 (two) times daily., Disp: 60 tablet, Rfl: 5    triamcinolone acetonide 0.1% (KENALOG) 0.1 % ointment, Apply topically 2 (two) times daily., Disp: 80 g, Rfl: 1    zolpidem (AMBIEN) 5 MG Tab, Take 1 tablet (5 mg total) by mouth every evening., Disp: 30 tablet, Rfl:  2

## 2025-05-14 LAB — CRC RECOMMENDATION EXT: NORMAL

## 2025-05-16 ENCOUNTER — PATIENT OUTREACH (OUTPATIENT)
Facility: HOSPITAL | Age: 40
End: 2025-05-16
Payer: MEDICAID

## 2025-05-16 NOTE — PROGRESS NOTES
Population Health Chart Review & Patient Outreach Details    Updates Requested / Reviewed:  [x]  Care Team Updated    Health Maintenance Topics Addressed and Outreach Outcomes / Actions Taken:  Colon Cancer Screening [x] HM Updated with May 2025 Colonoscopy (Dr. Almanzar). History Updated.

## 2025-06-17 ENCOUNTER — TELEPHONE (OUTPATIENT)
Dept: OBSTETRICS AND GYNECOLOGY | Facility: CLINIC | Age: 40
End: 2025-06-17
Payer: MEDICAID

## 2025-08-20 ENCOUNTER — OFFICE VISIT (OUTPATIENT)
Dept: FAMILY MEDICINE | Facility: CLINIC | Age: 40
End: 2025-08-20
Payer: MEDICAID

## 2025-08-20 VITALS
DIASTOLIC BLOOD PRESSURE: 73 MMHG | TEMPERATURE: 98 F | SYSTOLIC BLOOD PRESSURE: 107 MMHG | WEIGHT: 164 LBS | RESPIRATION RATE: 17 BRPM | HEART RATE: 70 BPM | HEIGHT: 60 IN | OXYGEN SATURATION: 96 % | BODY MASS INDEX: 32.2 KG/M2

## 2025-08-20 DIAGNOSIS — R51.9 NONINTRACTABLE HEADACHE, UNSPECIFIED CHRONICITY PATTERN, UNSPECIFIED HEADACHE TYPE: ICD-10-CM

## 2025-08-20 DIAGNOSIS — R11.0 NAUSEA: ICD-10-CM

## 2025-08-20 DIAGNOSIS — I10 PRIMARY HYPERTENSION: Primary | ICD-10-CM

## 2025-08-20 DIAGNOSIS — Z79.899 LONG TERM USE OF DRUG: ICD-10-CM

## 2025-08-20 DIAGNOSIS — M10.9 GOUT, UNSPECIFIED CAUSE, UNSPECIFIED CHRONICITY, UNSPECIFIED SITE: ICD-10-CM

## 2025-08-20 DIAGNOSIS — G43.709 CHRONIC MIGRAINE WITHOUT AURA WITHOUT STATUS MIGRAINOSUS, NOT INTRACTABLE: ICD-10-CM

## 2025-08-20 DIAGNOSIS — M17.12 PRIMARY OSTEOARTHRITIS OF LEFT KNEE: ICD-10-CM

## 2025-08-20 DIAGNOSIS — M25.562 CHRONIC PAIN OF LEFT KNEE: ICD-10-CM

## 2025-08-20 DIAGNOSIS — G89.29 CHRONIC PAIN OF LEFT KNEE: ICD-10-CM

## 2025-08-20 DIAGNOSIS — Z12.31 ENCOUNTER FOR SCREENING MAMMOGRAM FOR MALIGNANT NEOPLASM OF BREAST: ICD-10-CM

## 2025-08-20 DIAGNOSIS — G47.00 INSOMNIA, UNSPECIFIED TYPE: ICD-10-CM

## 2025-08-20 DIAGNOSIS — F32.89 OTHER DEPRESSION: ICD-10-CM

## 2025-08-20 LAB
ANION GAP SERPL CALCULATED.3IONS-SCNC: 11 MMOL/L (ref 7–16)
BUN SERPL-MCNC: 14 MG/DL (ref 7–19)
BUN/CREAT SERPL: 16 (ref 6–20)
CALCIUM SERPL-MCNC: 9.5 MG/DL (ref 8.4–10.2)
CHLORIDE SERPL-SCNC: 103 MMOL/L (ref 98–107)
CO2 SERPL-SCNC: 27 MMOL/L (ref 22–29)
CREAT SERPL-MCNC: 0.86 MG/DL (ref 0.55–1.02)
CTP QC/QA: YES
EGFR (NO RACE VARIABLE) (RUSH/TITUS): 87 ML/MIN/1.73M2
GLUCOSE SERPL-MCNC: 110 MG/DL (ref 74–100)
POC (AMP) AMPHETAMINE: NEGATIVE
POC (BAR) BARBITURATES: NEGATIVE
POC (BUP) BUPRENORPHINE: NEGATIVE
POC (BZO) BENZODIAZEPINES: NEGATIVE
POC (COC) COCAINE: NEGATIVE
POC (MDMA) METHYLENEDIOXYMETHAMPHETAMINE 3,4: NEGATIVE
POC (MET) METHAMPHETAMINE: NEGATIVE
POC (MOP) OPIATES: NEGATIVE
POC (MTD) METHADONE: NEGATIVE
POC (OXY) OXYCODONE: NEGATIVE
POC (PCP) PHENCYCLIDINE: NEGATIVE
POC (TCA) TRICYCLIC ANTIDEPRESSANTS: NEGATIVE
POC TEMPERATURE (URINE): 94.6
POC THC: NEGATIVE
POTASSIUM SERPL-SCNC: 3.6 MMOL/L (ref 3.5–5.1)
SODIUM SERPL-SCNC: 137 MMOL/L (ref 136–145)

## 2025-08-20 PROCEDURE — 3008F BODY MASS INDEX DOCD: CPT | Mod: CPTII,,, | Performed by: FAMILY MEDICINE

## 2025-08-20 PROCEDURE — 3078F DIAST BP <80 MM HG: CPT | Mod: CPTII,,, | Performed by: FAMILY MEDICINE

## 2025-08-20 PROCEDURE — 80305 DRUG TEST PRSMV DIR OPT OBS: CPT | Mod: QW,,, | Performed by: FAMILY MEDICINE

## 2025-08-20 PROCEDURE — 3074F SYST BP LT 130 MM HG: CPT | Mod: CPTII,,, | Performed by: FAMILY MEDICINE

## 2025-08-20 PROCEDURE — 1160F RVW MEDS BY RX/DR IN RCRD: CPT | Mod: CPTII,,, | Performed by: FAMILY MEDICINE

## 2025-08-20 PROCEDURE — 99214 OFFICE O/P EST MOD 30 MIN: CPT | Mod: ,,, | Performed by: FAMILY MEDICINE

## 2025-08-20 PROCEDURE — 80048 BASIC METABOLIC PNL TOTAL CA: CPT | Mod: ,,, | Performed by: CLINICAL MEDICAL LABORATORY

## 2025-08-20 PROCEDURE — 1159F MED LIST DOCD IN RCRD: CPT | Mod: CPTII,,, | Performed by: FAMILY MEDICINE

## 2025-08-20 RX ORDER — SUMATRIPTAN SUCCINATE 100 MG/1
TABLET ORAL
Qty: 9 TABLET | Refills: 5 | Status: SHIPPED | OUTPATIENT
Start: 2025-08-20

## 2025-08-20 RX ORDER — METHYLPREDNISOLONE 4 MG/1
4 TABLET ORAL DAILY
COMMUNITY
Start: 2025-08-16

## 2025-08-20 RX ORDER — COLCHICINE 0.6 MG/1
0.6 TABLET ORAL DAILY
Qty: 30 TABLET | Refills: 1 | Status: SHIPPED | OUTPATIENT
Start: 2025-08-20

## 2025-08-20 RX ORDER — FLUOXETINE 20 MG/1
CAPSULE ORAL
Qty: 270 CAPSULE | Refills: 1 | Status: SHIPPED | OUTPATIENT
Start: 2025-08-20

## 2025-08-20 RX ORDER — ZOLPIDEM TARTRATE 5 MG/1
5 TABLET ORAL NIGHTLY
Qty: 30 TABLET | Refills: 2 | Status: SHIPPED | OUTPATIENT
Start: 2025-08-20

## 2025-08-20 RX ORDER — ATENOLOL AND CHLORTHALIDONE TABLET 50; 25 MG/1; MG/1
1 TABLET ORAL DAILY
Qty: 90 TABLET | Refills: 1 | Status: SHIPPED | OUTPATIENT
Start: 2025-08-20

## 2025-08-20 RX ORDER — NIRMATRELVIR AND RITONAVIR 300-100 MG
KIT ORAL
COMMUNITY
Start: 2025-08-16

## 2025-08-20 RX ORDER — COLCHICINE 0.6 MG/1
0.6 TABLET ORAL DAILY
COMMUNITY
Start: 2025-07-29 | End: 2025-08-20 | Stop reason: SDUPTHER

## 2025-08-20 RX ORDER — IBUPROFEN 800 MG/1
800 TABLET, FILM COATED ORAL 3 TIMES DAILY PRN
Qty: 90 TABLET | Refills: 1 | Status: SHIPPED | OUTPATIENT
Start: 2025-08-20

## 2025-08-20 RX ORDER — CYCLOBENZAPRINE HCL 10 MG
10 TABLET ORAL 3 TIMES DAILY
Qty: 30 TABLET | Refills: 3 | Status: SHIPPED | OUTPATIENT
Start: 2025-08-20

## 2025-08-20 RX ORDER — PROMETHAZINE HYDROCHLORIDE 25 MG/1
25 TABLET ORAL EVERY 6 HOURS PRN
Qty: 60 TABLET | Refills: 1 | Status: SHIPPED | OUTPATIENT
Start: 2025-08-20

## 2025-08-20 RX ORDER — TOPIRAMATE 100 MG/1
100 TABLET, FILM COATED ORAL 2 TIMES DAILY
Qty: 60 TABLET | Refills: 5 | Status: SHIPPED | OUTPATIENT
Start: 2025-08-20 | End: 2026-08-20

## 2025-08-21 ENCOUNTER — RESULTS FOLLOW-UP (OUTPATIENT)
Dept: FAMILY MEDICINE | Facility: CLINIC | Age: 40
End: 2025-08-21
Payer: MEDICAID

## (undated) DEVICE — SOL IRRIGATION SALINE 3000ML BAG

## (undated) DEVICE — SUTURE VICRYL 4-0 FS2 UNDYED 27 IN

## (undated) DEVICE — V-LOC WOUND CLOSURE DEVICE 0 180

## (undated) DEVICE — GLOVE SENSICARE PI SURG 7

## (undated) DEVICE — PADDING WYTEX UNDRCST 2INX4YD

## (undated) DEVICE — GLOVE SURGICAL PROTEXIS PI SIZE 7

## (undated) DEVICE — GLOVE SENSICARE PI SURG 7.5

## (undated) DEVICE — SOL NACL IRR 1000ML BTL

## (undated) DEVICE — CDS GYN LAPAROSCOPY

## (undated) DEVICE — GLOVE SURGICAL PROTEXIS PI BLUE SIZE 7.0

## (undated) DEVICE — DEVICE SUTURING 10MM ENDO STITCH

## (undated) DEVICE — GLOVE SURGICAL PROTEXIS PI SIZE 6

## (undated) DEVICE — GOWN SURGICAL SMARTGOWN LEVEL 4 / EXTRA LARGE STERILE

## (undated) DEVICE — GLOVE SENSICARE PI SURG 6.5

## (undated) DEVICE — GOWN POLY REINF BRTH SLV XL

## (undated) DEVICE — SUTURE VICRYL 0 UR-6 27 IN VIOLET

## (undated) DEVICE — TROCAR BLADELESS Z-THREAD 5MM X 100MM

## (undated) DEVICE — SYRINGE 50CC LL STR 309653

## (undated) DEVICE — V-LOC WOUND CLOSURE DEVICE 180 LOOP

## (undated) DEVICE — SUTURE VICRYL 1 VIO CTX 36IN

## (undated) DEVICE — SUTURE PDS II 0 VIOLET 27 CT2

## (undated) DEVICE — APPLICATOR CHLORAPREP HI-LITE TINTED ORANGE 26ML

## (undated) DEVICE — IRRIGATOR SUCTION STYKERFLOW II DISP

## (undated) DEVICE — LAPARSCOPIC LIGASURE BLUNT TIP SEALER

## (undated) DEVICE — GLOVE SENSICARE PI GRN 7

## (undated) DEVICE — WARMER SCOPE DISP

## (undated) DEVICE — HEMOBLAST BELLOWS AND CANNULAS

## (undated) DEVICE — SUT ETHILON 5-0 P3 18IN BLK

## (undated) DEVICE — SOL IRRIGATION SALINE 0.9% 1000ML BOTTLE

## (undated) DEVICE — Device

## (undated) DEVICE — SOCKINETTE DOUBLE PLY 4X48IN

## (undated) DEVICE — OCCLUDER RUMI COLPO PNEUMO

## (undated) DEVICE — WIPE ANTI FOG DEFOGGER

## (undated) DEVICE — GLOVE SURGICAL PROTEXIS PI SIZE 6.5

## (undated) DEVICE — APPLICATOR CHLORAPREP ORN 26ML

## (undated) DEVICE — APPLICATOR LAPAROSCOPIC 35CM

## (undated) DEVICE — NDL HYPODERMIC SAFETY 25G 1IN

## (undated) DEVICE — GLOVE SENSICARE PI GRN 8

## (undated) DEVICE — NEEDLE INSUFFLATION DISP 14G X 120

## (undated) DEVICE — DISSECTOR KITTNER ENDO BLUNT DISP

## (undated) DEVICE — SYR 10CC LUER LOCK

## (undated) DEVICE — TRAY FOLEY CATH 16FR SILICONE LUBRI-SIL W/DRAINAGE BAG

## (undated) DEVICE — GELPOINT MINI ADVANCED ACCESS

## (undated) DEVICE — SET IRRIG CYSTO/BLADDER 78IN

## (undated) DEVICE — GLOVE SURGICAL PROTEXIS PI BLUE SIZE 6.5

## (undated) DEVICE — GLOVE SENSICARE PI GRN 6.5

## (undated) DEVICE — GLOVE SURGICAL PROTEXIS PI CLASSIC SIZE 8.0

## (undated) DEVICE — BANDAGE MATRIX HK LOOP 2IN 5YD

## (undated) DEVICE — GLOVE SURGICAL PROTEXIS PI BLUE SIZE 7.5

## (undated) DEVICE — ADHESIVE SKIN CLOSURE EXOFIN MICRO HV 1ML

## (undated) DEVICE — LAPARSCOPIC L-HOOK WIRE